# Patient Record
Sex: MALE | Race: WHITE | NOT HISPANIC OR LATINO | Employment: FULL TIME | URBAN - METROPOLITAN AREA
[De-identification: names, ages, dates, MRNs, and addresses within clinical notes are randomized per-mention and may not be internally consistent; named-entity substitution may affect disease eponyms.]

---

## 2020-03-11 ENCOUNTER — TRANSFERRED RECORDS (OUTPATIENT)
Dept: HEALTH INFORMATION MANAGEMENT | Facility: CLINIC | Age: 70
End: 2020-03-11

## 2020-09-21 ENCOUNTER — TRANSFERRED RECORDS (OUTPATIENT)
Dept: HEALTH INFORMATION MANAGEMENT | Facility: CLINIC | Age: 70
End: 2020-09-21

## 2020-11-17 ENCOUNTER — TRANSFERRED RECORDS (OUTPATIENT)
Dept: HEALTH INFORMATION MANAGEMENT | Facility: CLINIC | Age: 70
End: 2020-11-17

## 2021-01-04 ENCOUNTER — TRANSCRIBE ORDERS (OUTPATIENT)
Dept: OTHER | Age: 71
End: 2021-01-04

## 2021-01-04 DIAGNOSIS — N28.89 OTHER SPECIFIED DISORDERS OF KIDNEY AND URETER: Primary | ICD-10-CM

## 2021-01-11 NOTE — TELEPHONE ENCOUNTER
MEDICAL RECORDS REQUEST   [facility]  Urology Clinic  [address] [place] [address]  PHONE: [phone]  Fax: [phone]        FUTURE VISIT INFORMATION                                                   Sedrick Clements : 1950 scheduled for future visit at [facility]    APPOINTMENT INFORMATION:    Date: 2/3/2021 10:30AM    Provider:  Fabricio Gross MD    Reason for Visit/Diagnosis: Kidney mass    REFERRAL INFORMATION:    Referring provider:  N/A    Specialty: N/A    Referring providers [facility]    Clinic contact number:  N/A    RECORDS REQUESTED FOR VISIT                                                     NOTES  STATUS/DETAILS   OFFICE NOTE from referring provider  yes   OFFICE NOTE from other specialist  no   DISCHARGE SUMMARY from hospital  no   DISCHARGE REPORT from the ER  no   OPERATIVE REPORT  no   MEDICATION LIST  no   KIDNEY MASS     CT   yes     PRE-VISIT CHECKLIST      Record collection complete YES- [facility] rec[facility]  Images in  PACS    If no, please explain: [facility] and recs -   Appointment appropriately scheduled           (right time/right provider) Yes   MyChart activation If no, please explain: in process   Questionnaire complete If no, please explain: in process      Completed by: Nida Yepez

## 2021-01-27 ENCOUNTER — PRE VISIT (OUTPATIENT)
Dept: UROLOGY | Facility: CLINIC | Age: 71
End: 2021-01-27

## 2021-01-27 NOTE — TELEPHONE ENCOUNTER
Chief Complaint : Consult     Hx/Sx: L Renal Mass, BPH w/ LUTS    Records/Orders: [facility]    Pt Contacted: N/a    At Rooming: emmanuel Jones EMT

## 2021-02-03 ENCOUNTER — PRE VISIT (OUTPATIENT)
Dept: UROLOGY | Facility: CLINIC | Age: 71
End: 2021-02-03

## 2021-02-03 ENCOUNTER — TELEPHONE (OUTPATIENT)
Dept: UROLOGY | Facility: CLINIC | Age: 71
End: 2021-02-03

## 2021-02-03 NOTE — TELEPHONE ENCOUNTER
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 He will call back to reschedule.      Action Taken: Message routed to:  [facility] Urology    Travel Screening: Not Applicable

## 2021-02-03 NOTE — TELEPHONE ENCOUNTER
[facility] Call Center    Phone Message    May a detailed message be left on voicemail: no     Reason for Call: Other: Pt has a video visit with Dr. Gross today at 10:30 AM.  Pt says he hasn't received a video visit link in either of his email or phone.  Please call back at .     Action Taken: Message routed to:  [facility] Urology    Travel Screening: Not Applicable

## 2021-02-10 ENCOUNTER — VIRTUAL VISIT (OUTPATIENT)
Dept: UROLOGY | Facility: CLINIC | Age: 71
End: 2021-02-10
Payer: COMMERCIAL

## 2021-02-10 VITALS — BODY MASS INDEX: 34.51 KG/M2 | WEIGHT: 233 LBS | HEIGHT: 69 IN

## 2021-02-10 DIAGNOSIS — N28.89 LEFT RENAL MASS: Primary | ICD-10-CM

## 2021-02-10 PROBLEM — E11.9 DIABETES MELLITUS (H): Status: ACTIVE | Noted: 2020-06-05

## 2021-02-10 PROBLEM — N28.9 KIDNEY DISORDER: Status: ACTIVE | Noted: 2020-09-24

## 2021-02-10 PROCEDURE — 99213 OFFICE O/P EST LOW 20 MIN: CPT | Mod: 95 | Performed by: UROLOGY

## 2021-02-10 RX ORDER — SEMAGLUTIDE 1.34 MG/ML
0.5 INJECTION, SOLUTION SUBCUTANEOUS
COMMUNITY

## 2021-02-10 RX ORDER — METFORMIN HCL 500 MG
1000 TABLET, EXTENDED RELEASE 24 HR ORAL
COMMUNITY
Start: 2020-09-12 | End: 2021-12-16

## 2021-02-10 RX ORDER — OMEPRAZOLE 40 MG/1
40 CAPSULE, DELAYED RELEASE ORAL 2 TIMES DAILY
COMMUNITY
Start: 2020-05-27 | End: 2021-12-16

## 2021-02-10 RX ORDER — SENNOSIDES A AND B 8.6 MG/1
8.6 TABLET, FILM COATED ORAL 2 TIMES DAILY
COMMUNITY
Start: 2020-09-12 | End: 2021-12-16

## 2021-02-10 RX ORDER — CALCIUM CARBONATE 500 MG/1
3-4 TABLET, CHEWABLE ORAL
COMMUNITY
End: 2021-12-16

## 2021-02-10 RX ORDER — GLIPIZIDE 10 MG/1
10 TABLET ORAL
COMMUNITY
End: 2021-12-15

## 2021-02-10 ASSESSMENT — PAIN SCALES - GENERAL: PAINLEVEL: MILD PAIN (2)

## 2021-02-10 ASSESSMENT — MIFFLIN-ST. JEOR: SCORE: 1817.26

## 2021-02-10 NOTE — NURSING NOTE
Chief Complaint   Patient presents with     Consult For     renal mass     - Claudia Rodas,   EMT Clinic Support

## 2021-02-10 NOTE — PROGRESS NOTES
Sedrick Clements is a 70 year old male who is being evaluated via a billable video visit.      How would you like to obtain your AVS? Mail a copy  If the video visit is dropped, the invitation should be resent by: Send to e-mail at: [email or web address]  Will anyone else be joining your video visit? No    Video Start Time: 12:07 PM    CHIEF COMPLAINT   It was my pleasure to see Sedrick Clements who is a 70 year old male for follow-up of renal mass.         Consult or Referral:     Mr. Sedrick Clements is a 70 year old male evaluated at the request of Dr. Carlos.         History of Present Illness:    Sedrick Clements is a very pleasant 70 year old male who presents with a history of left renal mass. This has been noted previously. [facility] and also at [facility] and has a known left lower pole renal mass. He has been referred back and forth for this and is seeking opinion about treatment options. Has MRI demonstrating mass from Sept of last year. He reports he also had CT done last year, but this is not available for review. Has more recently been seen at [facility] and is interested in biopsy or treatment options.  He reports no flank pain, abd pain, or hematuria.    MRI 9/2020  2.2 cm partially exophytic enhancing lesion in the anterior lower pole of left  kidney (series 16, image 86 and 193). No renal sinus fat invasion. Left renal  vein is patent. Bilateral renal cysts. No abdominal adenopathy.         Past Medical History:   DM  HTN         Past Surgical History:   Ankle Surgery  [facility]         Medications     Current Outpatient Medications   Medication     bismuth subsalicylate (PEPTO BISMOL) 262 MG/15ML suspension     calcium carbonate (TUMS) 500 MG chewable tablet     glipiZIDE (GLUCOTROL) 10 MG tablet     metFORMIN (GLUCOPHAGE-XR) 500 MG 24 hr tablet     omeprazole (PRILOSEC) 40 MG DR capsule     semaglutide (OZEMPIC, 1 MG/DOSE,) 2 MG/1.5ML pen     senna (SENOKOT) 8.6 MG tablet     No current  facility-administered medications for this visit.             Family History:   No known family history of  malignancy.         Social History:     Social History     Socioeconomic History     Marital status: Single     Spouse name: Not on file     Number of children: Not on file     Years of education: Not on file     Highest education level: Not on file   Occupational History     Not on file   Social Needs     Financial resource strain: Not on file     Food insecurity     Worry: Not on file     Inability: Not on file     Transportation needs     Medical: Not on file     Non-medical: Not on file   Tobacco Use     Smoking status: Not on file   Substance and Sexual Activity     Alcohol use: Not on file     Drug use: Not on file     Sexual activity: Not on file   Lifestyle     Physical activity     Days per week: Not on file     Minutes per session: Not on file     Stress: Not on file   Relationships     Social connections     Talks on phone: Not on file     Gets together: Not on file     Attends [employer] service: Not on file     Active member of club or organization: Not on file     Attends meetings of clubs or organizations: Not on file     Relationship status: Not on file     Intimate partner violence     Fear of current or ex partner: Not on file     Emotionally abused: Not on file     Physically abused: Not on file     Forced sexual activity: Not on file   Other Topics Concern     Not on file   Social History Narrative     Not on file            Allergies:   Insulins and Empagliflozin         Review of Systems:      Constitutional, HEENT, cardiovascular, pulmonary, gi and gu systems are negative, except as otherwise noted.         Physical Exam:     Vitals:  No vitals were obtained today due to virtual visit.    Physical Exam   GENERAL: Healthy, alert and no distress  EYES: Eyes grossly normal to inspection.  No discharge or erythema, or obvious scleral/conjunctival abnormalities.  RESP: No audible wheeze,  cough, or visible cyanosis.  No visible retractions or increased work of breathing.    SKIN: Visible skin clear. No significant rash, abnormal pigmentation or lesions.  NEURO: Cranial nerves grossly intact.  Mentation and speech appropriate for age.  PSYCH: Mentation appears normal, affect normal/bright, judgement and insight intact, normal speech and appearance well-groomed.      Imaging:    I directly visualized and reviewed all applicable imaging a with patient.    MRCP 9/10/2020  1. Cholelithiasis. Dilated gallbladder with mild wall thickening and  pericholecystic fat stranding with trace fluid, concerning for cholecystitis.  Correlate with Dickens's sign. No choledocholithiasis. Right anterior IHD arising  near the level of cystic duct origin, anatomic variant.  2. A 2 cm enhancing lesion in the lower pole of the left kidney, suspicious for  a primary renal neoplasm, potentially RCC.  3. Diffuse hepatic steatosis.      Outside and Past Medical records:    Review of external notes as documented above   Review of prior external note(s) from - Outside records from [facility]  Review of the result(s) of each unique test - MRI, BMP, CBC         Assessment and Plan:     Assessment: 70 year old male with history of 2 cm left lower pole renal mass. We had an extensive discussion about the significance of a localized, enhancing kidney mass.  We discussed that approximately 80% of enhancing renal masses turn out to be kidney cancer upon removal, while 20% are found to be benign.  Although certain features such as size, gender and tumor characteristics can change these risks.    We discussed the role of renal mass biopsy including the fact that renal mass biopsy is safe with current techniques, it can be inaccurate and it can be non-diagnostic.  Furthermore, the majority of patients find they ultimately need to proceed with treatment anyway.      We discussed the options for treatment of a localized kidney mass including active  surveillance, thermal ablation, and surgical extirpation.  Surgical removal has the best track record and close to a 99% chance of cure for T1a lesions compared to 90% cure with thermal ablation and is generally preferred.  Furthermore, thermal ablation is not optimal for larger masses or centrally located masses.      We discussed the role of observation and the low risk of metastases associated with lesions less than 2-3 cm in size and the potential for slow/stable growth in many cases.  However, the unpredictable natural history of these lesions was mentioned as a drawback with this approach and the lack of effective therapy in the event of systemic progression.  In general, this approach is most favored for those with limited life expectancy and/or those with indeterminate (< 2-3 cm) renal masses.    Furthermore, we discussed the relative merits of partial nephrectomy vs. radical nephrectomy and the theoretic basis behind maximal nephron preservation.  There is some data suggesting there are long term survival advantages and equivalent cancer control with nephron sparing surgery.  We also discussed the advantages and disadvantages and roles of open surgery vs. laparoscopic (and Da Roselyn assisted) surgery. Risks and benefits of robotic partial nephrectomy were discussed in detail. Risks of surgery including bleeding, infection, MI, stroke, DVT/PE, bowel injury and injury to other surrounding structures were discussed. In addition, we discussed potential for positive surgical margins, urine leak, vascular injury, and potential need for radical nephrectomy or conversion to an open procedure.      The anticipated post-operative course was explained as well as expectations for recovery. All questions were answered.    He is interested in at least a short-term period of observation for this mass. Will plan to follow-up in 6 months with CT renal mass protocol. In the interim, I will also review his recent CT should  this get sent to us.    Plan:  Will send me CT  Tentative plan for CT in 6 months    Orders  Orders Placed This Encounter   Procedures     CT Renal Mass wo & w Contrast     Video-Visit Details    Type of service:  Video Visit    Video End Time:12:30 PM    Originating Location (pt. Location): Home    Distant Location (provider location):  [facility] UROLOGY AND [facility] FOR PROSTATE AND UROLOGIC CANCERS    Platform used for Video Visit: Rafael Gross MD  Urology  [facility]

## 2021-02-10 NOTE — LETTER
2/10/2021       RE: Sedrick Clements  [address]  [facility] [address]     Dear Colleague,    Thank you for referring your patient, Sedrick Clements, to the [facility] UROLOGY [facility]IN[facility] [place] at [facility]. Please see a copy of my visit note below.    Sedrick Clements is a 70 year old male who is being evaluated via a billable video visit.      How would you like to obtain your AVS? Mail a copy  If the video visit is dropped, the invitation should be resent by: Send to e-mail at: [email or web address]  Will anyone else be joining your video visit? No    Video Start Time: 12:07 PM    CHIEF COMPLAINT   It was my pleasure to see Sedrick Clements who is a 70 year old male for follow-up of renal mass.         Consult or Referral:     Mr. Sedrick Clements is a 70 year old male evaluated at the request of Dr. Carlos.         History of Present Illness:    Sedrick Clements is a very pleasant 70 year old male who presents with a history of left renal mass. This has been noted previously. [facility] and has a known left lower pole renal mass. He has been referred back and forth for this and is seeking opinion about treatment options. Has MRI demonstrating mass from Sept of last year. He reports he also had CT done last year, but this is not available for review. Has more recently been seen at [facility] and is interested in biopsy or treatment options.  He reports no flank pain, abd pain, or hematuria.    MRI 9/2020  2.2 cm partially exophytic enhancing lesion in the anterior lower pole of left  kidney (series 16, image 86 and 193). No renal sinus fat invasion. Left renal  vein is patent. Bilateral renal cysts. No abdominal adenopathy.         Past Medical History:   DM  HTN         Past Surgical History:   Ankle Surgery  Patricia         Medications     Current Outpatient Medications   Medication     bismuth subsalicylate (PEPTO BISMOL) 262  ED Attending Note      Patient : Kika Dubon Age: 68 year old Sex: female   MRN: [id number] Encounter Date: 8/25/2020    History     Chief Complaint   Patient presents with   • Weakness     HPI: Patient is a 68-year-old female, no reported past medical history, presents today for evaluation of weakness and fatigue for the last several days.  Patient states that she has been having a mild, nonproductive cough, as well as some bilateral lower extremity redness and swelling.  Patient states she lives by her self.  Patient noted to slightly disheveled.  Patient with poorly bandaged leg wounds as well as dirty feet.  Patient states that she has hard time taking care of herself at home.  Patient states she is feeling much more weak recently.  Patient denies any fever, chills, nausea, vomiting, chest pain or abdominal pain.    Allergies     No Known Allergies  Medications     Prior to Admission medications    Medication Sig Start Date End Date Taking? Authorizing Provider   cephalexin (Keflex) 500 MG capsule Take 1 capsule by mouth 2 times daily for 10 days. 8/20/20 8/30/20  Alfredo Pablo DO   doxycycline hyclate (VIBRA-TABS) 100 MG tablet Take 1 tablet by mouth 2 times daily. 8/20/20   Alfredo Pablo DO      Past Medical History     No past medical history on file.  Past Surgical History     History reviewed. No pertinent surgical history.    Family History     No family history on file.   Social History     Social History     Tobacco Use   • Smoking status: Never Smoker   • Smokeless tobacco: Never Used   Substance Use Topics   • Alcohol use: Yes     Comment: every other day   • Drug use: Never     Review of Systems     Review of Systems   Constitutional: Negative for chills and fever.   HENT: Negative for ear pain and sore throat.    Eyes: Negative for visual disturbance.   Respiratory: Negative for cough and shortness of breath.    Cardiovascular: Negative for chest pain and palpitations.   Gastrointestinal: Negative  MG/15ML suspension     calcium carbonate (TUMS) 500 MG chewable tablet     glipiZIDE (GLUCOTROL) 10 MG tablet     metFORMIN (GLUCOPHAGE-XR) 500 MG 24 hr tablet     omeprazole (PRILOSEC) 40 MG DR capsule     semaglutide (OZEMPIC, 1 MG/DOSE,) 2 MG/1.5ML pen     senna (SENOKOT) 8.6 MG tablet     No current facility-administered medications for this visit.             Family History:   No known family history of  malignancy.         Social History:     Social History     Socioeconomic History     Marital status: Single     Spouse name: Not on file     Number of children: Not on file     Years of education: Not on file     Highest education level: Not on file   Occupational History     Not on file   Social Needs     Financial resource strain: Not on file     Food insecurity     Worry: Not on file     Inability: Not on file     Transportation needs     Medical: Not on file     Non-medical: Not on file   Tobacco Use     Smoking status: Not on file   Substance and Sexual Activity     Alcohol use: Not on file     Drug use: Not on file     Sexual activity: Not on file   Lifestyle     Physical activity     Days per week: Not on file     Minutes per session: Not on file     Stress: Not on file   Relationships     Social connections     Talks on phone: Not on file     Gets together: Not on file     Attends Sabianist service: Not on file     Active member of club or organization: Not on file     Attends meetings of clubs or organizations: Not on file     Relationship status: Not on file     Intimate partner violence     Fear of current or ex partner: Not on file     Emotionally abused: Not on file     Physically abused: Not on file     Forced sexual activity: Not on file   Other Topics Concern     Not on file   Social History Narrative     Not on file            Allergies:   Insulins and Empagliflozin         Review of Systems:      Constitutional, HEENT, cardiovascular, pulmonary, gi and gu systems are negative, except as  for abdominal pain, diarrhea, nausea and vomiting.   Genitourinary: Negative for dysuria.   Musculoskeletal: Negative for back pain and myalgias.   Skin: Positive for wound. Negative for rash.   Neurological: Positive for weakness. Negative for dizziness and light-headedness.   Hematological: Negative for adenopathy.     Physical Exam     ED Triage Vitals [08/25/20 2027]   Enc Vitals Group      /79      Heart Rate 96      Resp (!) 22      Temp 97.3 °F (36.3 °C)      Temp src Oral      SpO2 95 %      Weight 192 lb 3.9 oz (87.2 kg)      Height 5' 7\" (1.702 m)     Physical Exam  Constitutional:       General: She is not in acute distress.     Appearance: Normal appearance.   HENT:      Head: Normocephalic and atraumatic.      Right Ear: Tympanic membrane normal.      Left Ear: Tympanic membrane normal.      Nose: Nose normal.      Mouth/Throat:      Mouth: Mucous membranes are moist.      Pharynx: No oropharyngeal exudate.   Eyes:      Extraocular Movements: Extraocular movements intact.      Pupils: Pupils are equal, round, and reactive to light.   Neck:      Musculoskeletal: Neck supple.   Cardiovascular:      Rate and Rhythm: Normal rate and regular rhythm.      Pulses: Normal pulses.      Heart sounds: Normal heart sounds. No murmur.   Pulmonary:      Effort: Pulmonary effort is normal. No respiratory distress.      Breath sounds: Normal breath sounds.   Abdominal:      General: Abdomen is flat. Bowel sounds are normal.      Palpations: Abdomen is soft.      Tenderness: There is no abdominal tenderness.   Musculoskeletal:         General: No deformity.   Skin:     General: Skin is warm and dry.      Capillary Refill: Capillary refill takes less than 2 seconds.      Findings: No rash.      Comments: Erythema to bilateral lower extremities.  There is development of ulcerated wound to right lower extremity.  There is old tissue paper and hair in the wound.  Additionally, feces apparent on patient's lower feet.   Significant malodor   Neurological:      General: No focal deficit present.      Mental Status: She is alert and oriented to person, place, and time.   Psychiatric:         Behavior: Behavior normal.       Lab Results     Results for orders placed or performed during the hospital encounter of 08/25/20   CBC with Automated Differential   Result Value Ref Range    WBC 9.2 4.2 - 11.0 K/mcL    RBC 3.97 (L) 4.00 - 5.20 mil/mcL    HGB 13.7 12.0 - 15.5 g/dL    HCT 40.0 36.0 - 46.5 %    .8 (H) 78.0 - 100.0 fl    MCH 34.5 (H) 26.0 - 34.0 pg    MCHC 34.3 32.0 - 36.5 g/dL    RDW-CV 12.9 11.0 - 15.0 %     140 - 450 K/mcL    NRBC 0 0 /100 WBC    DIFF TYPE AUTOMATED DIFFERENTIAL     Neutrophil 74 %    LYMPH 13 %    MONO 12 %    EOSIN 0 %    BASO 0 %    Percent Immature Granuloctyes 1 %    Absolute Neutrophil 6.8 1.8 - 7.7 K/mcL    Absolute Lymph 1.2 1.0 - 4.0 K/mcL    Absolute Mono 1.1 (H) 0.3 - 0.9 K/mcL    Absolute Eos 0.0 (L) 0.1 - 0.5 K/mcL    Absolute Baso 0.0 0.0 - 0.3 K/mcL    Absolute Immature Granulocytes 0.1 0 - 0.2 K/mcl   Comprehensive Metabolic Panel   Result Value Ref Range    Sodium 134 (L) 135 - 145 mmol/L    Potassium 3.7 3.4 - 5.1 mmol/L    Chloride 99 98 - 107 mmol/L    Carbon Dioxide 25 21 - 32 mmol/L    Anion Gap 14 10 - 20 mmol/L    Glucose 104 (H) 65 - 99 mg/dL    BUN 14 6 - 20 mg/dL    Creatinine 0.71 0.51 - 0.95 mg/dL    GFR Estimate,  >90     GFR Estimate, Non  89     BUN/Creatinine Ratio 20 7 - 25    CALCIUM 9.1 8.4 - 10.2 mg/dL    TOTAL BILIRUBIN 0.7 0.2 - 1.0 mg/dL    AST/SGOT 64 (H) <38 Units/L    ALT/SGPT 55 <64 Units/L    ALK PHOSPHATASE 256 (H) 45 - 117 Units/L    TOTAL PROTEIN 7.0 6.4 - 8.2 g/dL    Albumin 2.3 (L) 3.6 - 5.1 g/dL    GLOBULIN 4.7 (H) 2.0 - 4.0 g/dL    A/G Ratio, Serum 0.5 (L) 1.0 - 2.4   Troponin I Ultra Sensitive   Result Value Ref Range    TROPONIN I <0.02 <0.05 ng/mL   NT proBNP   Result Value Ref Range    NT proBNP 734 (H) <126  otherwise noted.         Physical Exam:     Vitals:  No vitals were obtained today due to virtual visit.    Physical Exam   GENERAL: Healthy, alert and no distress  EYES: Eyes grossly normal to inspection.  No discharge or erythema, or obvious scleral/conjunctival abnormalities.  RESP: No audible wheeze, cough, or visible cyanosis.  No visible retractions or increased work of breathing.    SKIN: Visible skin clear. No significant rash, abnormal pigmentation or lesions.  NEURO: Cranial nerves grossly intact.  Mentation and speech appropriate for age.  PSYCH: Mentation appears normal, affect normal/bright, judgement and insight intact, normal speech and appearance well-groomed.      Imaging:    I directly visualized and reviewed all applicable imaging a with patient.    MRCP 9/10/2020  1. Cholelithiasis. Dilated gallbladder with mild wall thickening and  pericholecystic fat stranding with trace fluid, concerning for cholecystitis.  Correlate with Dickens's sign. No choledocholithiasis. Right anterior IHD arising  near the level of cystic duct origin, anatomic variant.  2. A 2 cm enhancing lesion in the lower pole of the left kidney, suspicious for  a primary renal neoplasm, potentially RCC.  3. Diffuse hepatic steatosis.      Outside and Past Medical records:    Review of external notes as documented above   Review of prior external note(s) from - Outside records from [facility]  Review of the result(s) of each unique test - MRI, BMP, CBC         Assessment and Plan:     Assessment: 70 year old male with history of 2 cm left lower pole renal mass. We had an extensive discussion about the significance of a localized, enhancing kidney mass.  We discussed that approximately 80% of enhancing renal masses turn out to be kidney cancer upon removal, while 20% are found to be benign.  Although certain features such as size, gender and tumor characteristics can change these risks.    We discussed the role of renal mass biopsy  including the fact that renal mass biopsy is safe with current techniques, it can be inaccurate and it can be non-diagnostic.  Furthermore, the majority of patients find they ultimately need to proceed with treatment anyway.      We discussed the options for treatment of a localized kidney mass including active surveillance, thermal ablation, and surgical extirpation.  Surgical removal has the best track record and close to a 99% chance of cure for T1a lesions compared to 90% cure with thermal ablation and is generally preferred.  Furthermore, thermal ablation is not optimal for larger masses or centrally located masses.      We discussed the role of observation and the low risk of metastases associated with lesions less than 2-3 cm in size and the potential for slow/stable growth in many cases.  However, the unpredictable natural history of these lesions was mentioned as a drawback with this approach and the lack of effective therapy in the event of systemic progression.  In general, this approach is most favored for those with limited life expectancy and/or those with indeterminate (< 2-3 cm) renal masses.    Furthermore, we discussed the relative merits of partial nephrectomy vs. radical nephrectomy and the theoretic basis behind maximal nephron preservation.  There is some data suggesting there are long term survival advantages and equivalent cancer control with nephron sparing surgery.  We also discussed the advantages and disadvantages and roles of open surgery vs. laparoscopic (and Da Roselyn assisted) surgery. Risks and benefits of robotic partial nephrectomy were discussed in detail. Risks of surgery including bleeding, infection, MI, stroke, DVT/PE, bowel injury and injury to other surrounding structures were discussed. In addition, we discussed potential for positive surgical margins, urine leak, vascular injury, and potential need for radical nephrectomy or conversion to an open procedure.      The  pg/mL   TIER 1   Result Value Ref Range    Lactic Acid RC Venous 1.4 <2.0 mmol/L   URINALYSIS WITH MICRO & CULTURE IF INDICATED   Result Value Ref Range    KETONES TRACE (A) NEGATIVE mg/dL    SPECIMEN TYPE URINE CLEAN CATCH     COLOR PARKER (A) YELLOW    APPEARANCE HAZY     GLUCOSE(URINE) NEGATIVE NEGATIVE mg/dL    BILIRUBIN POSITIVE (A) NEGATIVE    SPECIFIC GRAVITY 1.027 1.005 - 1.030    BLOOD NEGATIVE NEGATIVE    pH 6.0 5.0 - 7.0 Units    PROTEIN(URINE) 30 (A) NEGATIVE mg/dL    UROBILINOGEN 4.0 (H) 0.0 - 1.0 mg/dL    NITRITE NEGATIVE NEGATIVE    LEUKOCYTE ESTERASE NEGATIVE NEGATIVE    Squamous EPI'S 1 to 5 0 - 5 /hpf    RBC 1 to 2 0 - 2 /hpf    WBC 1 to 5 0 - 5 /hpf    BACTERIA NONE SEEN NONE SEEN /hpf    Hyaline Casts 1 to 5 0 - 5 /lpf    MUCOUS PRESENT    Magnesium   Result Value Ref Range    MAGNESIUM 2.0 1.7 - 2.4 mg/dL   Procalcitonin   Result Value Ref Range    PROCALCITONIN <0.05 <0.10 ng/mL     EKG Results     EKG Interpretation  Comments: Normal sinus rhythm with ventricular rate of 87 bpm.  No ST segment ovation noted.  Normal QRS duration.  Normal QTC.  Otherwise unremarkable EKG.    EKG tracing interpreted by ED physician  Radiology Results     Imaging Results          XR CHEST PA AND LATERAL 2 VIEWS (Final result)  Result time 08/25/20 21:21:51    Final result                 Impression:       As above      Electronically Signed by: JEREMY CHRISTY M.D.   Signed on: 8/25/2020 9:21 PM                Narrative:    EXAM: XR CHEST PA AND LATERAL 2 VIEWS    CLINICAL INDICATION: Chest pain    COMPARISON: None.    FINDINGS: There is a focal opacity in the right perihilar region.  This measures 46 x 27 mm.  Although this could be infectious such as a round pneumonia, follow-up imaging should be obtained to exclude a pulmonary mass.      Elevation of the right hemidiaphragm.  Cardiomegaly.       Pulmonary vascularity slightly increased.    The trachea is deviated to the right.  Could be related to a enlarged  anticipated post-operative course was explained as well as expectations for recovery. All questions were answered.    He is interested in at least a short-term period of observation for this mass. Will plan to follow-up in 6 months with CT renal mass protocol. In the interim, I will also review his recent CT should this get sent to us.    Plan:  Will send me CT  Tentative plan for CT in 6 months    Orders  Orders Placed This Encounter   Procedures     CT Renal Mass wo & w Contrast     Video-Visit Details    Type of service:  Video Visit    Video End Time:12:30 PM    Originating Location (pt. Location): Home    Distant Location (provider location):  [facility] UROLOGY AND [facility] FOR PROSTATE AND UROLOGIC CANCERS    Platform used for Video Visit: Rafael Gross MD  Urology  [facility]       thyroid, vascular adenopathy.  Further diagnostic imaging.                              ED Course     ED Course as of Aug 25 2230   Tue Aug 25, 2020   2204 Patient admitted to Dr. Tio Goode.    [AA]      ED Course User Index  [AA] Ran Silva MD       ED Medication Orders (From admission, onward)    Ordered Start     Status Ordering Provider    08/25/20 2203 08/25/20 2203  SODIUM CHLORIDE 0.9 % IV SOLN Pyxis Override     Note to Pharmacy: KAIT PURCELL   : cabinet override    Last MAR action: New Bag     08/25/20 2153 08/25/20 2200  cefTRIAXone (ROCEPHIN) syringe 2,000 mg  ONCE      Last MAR action: Given RAN SILVA          Patient did have mask on for their entire stay in the emergency department.  Additionally, I did wear recommended personal protective equipment, including face mask, eye protection, gloves and gown when initially assessing patient and when re-examining.    MDM  Number of Diagnoses or Management Options  Failure to thrive in adult:   Weakness:   Diagnosis management comments: Patient presents today for evaluation of weakness.  Patient disheveled.  Patient with leg wounds which are not capped.  Patient had these cleaned and prepped.  Patient additionally concern for possible CHF.  Patient elevated BNP.  EKG nonischemic.  Urinalysis initially concerning for infection, however no significant bacteria in urine.  Patient was given dose of ceftriaxone.  This also will cover for cellulitis of lower extremity.  Patient be admitted for further evaluation and care including need for evaluation by care management for assistance at home as patient can obviously not take care of herself appropriately.  Patient admitted to Dr. Goode.      Clinical Impression     ED Diagnosis   1. Weakness     2. Failure to thrive in adult       Disposition        Admit 8/25/2020 10:06 PM  Telemetry Bed?: No  Patient Class: Inpatient [1]  Level of Care: Acute [1]  Admission Diagnosis: UTI (urinary  tract infection) [457370]  Admitting Physician: CHRYSTAL STACY [[id number]]  Comments: assymptoatic surv, no test  Is this a telephone or verbal order?: This is a telephone order from the admitting physician      Ran Silva MD   8/25/2020 10:27 PM                  Ran Silva MD  08/25/20 6032

## 2021-02-16 ENCOUNTER — DOCUMENTATION ONLY (OUTPATIENT)
Dept: UROLOGY | Facility: CLINIC | Age: 71
End: 2021-02-16

## 2021-02-16 NOTE — PROGRESS NOTES
Eduardo Alva 2/16/21 10:06AM   Action Taken RTN PT   CSS called and LM for pt to return call, Inquiring on any recent CT images for his 6 month follow up.

## 2021-02-18 ENCOUNTER — TELEPHONE (OUTPATIENT)
Dept: ONCOLOGY | Facility: CLINIC | Age: 71
End: 2021-02-18

## 2021-02-18 NOTE — TELEPHONE ENCOUNTER
[facility] Call Center    Phone Message    May a detailed message be left on voicemail: yes     Reason for Call: Other: Pt wondering whether or not he needs to do the CT scan now that he had [place] send records of a CT scan from 9/21/20. Also wants to clarify if he needs to be seen in 6 weeks or 6 months. Please call back.     Action Taken: Message routed to:  [facility] ([facility] urology    Travel Screening: Not Applicable

## 2021-08-17 ENCOUNTER — PRE VISIT (OUTPATIENT)
Dept: UROLOGY | Facility: CLINIC | Age: 71
End: 2021-08-17

## 2021-08-17 NOTE — TELEPHONE ENCOUNTER
Reason for visit: Follow up w/ CT     Relevant information: Left renal mass    Records/imaging/labs/orders: CT renal mass on 9/2 before visit    Pt called: N/A    At Rooming: Video

## 2021-09-02 ENCOUNTER — ANCILLARY PROCEDURE (OUTPATIENT)
Dept: CT IMAGING | Facility: CLINIC | Age: 71
End: 2021-09-02
Attending: UROLOGY
Payer: COMMERCIAL

## 2021-09-02 ENCOUNTER — VIRTUAL VISIT (OUTPATIENT)
Dept: UROLOGY | Facility: CLINIC | Age: 71
End: 2021-09-02
Payer: COMMERCIAL

## 2021-09-02 VITALS — WEIGHT: 230 LBS | BODY MASS INDEX: 34.07 KG/M2 | HEIGHT: 69 IN

## 2021-09-02 DIAGNOSIS — N28.89 LEFT RENAL MASS: ICD-10-CM

## 2021-09-02 DIAGNOSIS — N28.89 LEFT RENAL MASS: Primary | ICD-10-CM

## 2021-09-02 LAB
CREAT BLD-MCNC: 1 MG/DL (ref 0.7–1.3)
GFR SERPL CREATININE-BSD FRML MDRD: >60 ML/MIN/1.73M2

## 2021-09-02 PROCEDURE — 99214 OFFICE O/P EST MOD 30 MIN: CPT | Mod: 95 | Performed by: UROLOGY

## 2021-09-02 PROCEDURE — 74178 CT ABD&PLV WO CNTR FLWD CNTR: CPT | Performed by: RADIOLOGY

## 2021-09-02 RX ORDER — IOPAMIDOL 755 MG/ML
135 INJECTION, SOLUTION INTRAVASCULAR ONCE
Status: COMPLETED | OUTPATIENT
Start: 2021-09-02 | End: 2021-09-02

## 2021-09-02 RX ORDER — CEFAZOLIN SODIUM 2 G/50ML
2 SOLUTION INTRAVENOUS SEE ADMIN INSTRUCTIONS
Status: CANCELLED | OUTPATIENT
Start: 2021-09-02

## 2021-09-02 RX ORDER — CEFAZOLIN SODIUM 2 G/50ML
2 SOLUTION INTRAVENOUS
Status: CANCELLED | OUTPATIENT
Start: 2021-09-02

## 2021-09-02 RX ADMIN — IOPAMIDOL 135 ML: 755 INJECTION, SOLUTION INTRAVASCULAR at 09:53

## 2021-09-02 ASSESSMENT — PAIN SCALES - GENERAL: PAINLEVEL: NO PAIN (0)

## 2021-09-02 ASSESSMENT — MIFFLIN-ST. JEOR: SCORE: 1798.65

## 2021-09-02 NOTE — PROGRESS NOTES
Sedrick Clements is a 71 year old male who is being evaluated via a billable video visit.      How would you like to obtain your AVS? Mail a copy  If the video visit is dropped, the invitation should be resent by: Send to e-mail at: [email or web address]  Will anyone else be joining your video visit? No    Video Start Time: 1:07 PM    CHIEF COMPLAINT   It was my pleasure to see Sedrick Clements who is a 71 year old male for follow-up of renal mass.      HPI  Sedrick Clements is a very pleasant 70 year old male who presents with a history of left renal mass. This has been noted previously. [facility], and also at [facility] and has a known left lower pole renal mass. He has been referred back and forth for this and is seeking opinion about treatment options. Mass has previously been small, and has been monitored.    Here to review interval CT scan, demonstrating growth of this left renal mass.  This now measures 2.5 cm. He continues to have no symptoms. No flank pain. No hematuria.    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 No evidence of local  regional or distant metastasis. Cholecystectomy. Accessory splenules.  No evidence of renal vein or IVC involvement. Additional  simple-appearing left renal cysts. Possible mild small bowel  enteritis. Prostate enlargement.     MRI 9/2020  2.2 cm partially exophytic enhancing lesion in the anterior lower pole of left  kidney (series 16, image 86 and 193). No renal sinus fat invasion. Left renal  vein is patent. Bilateral renal cysts. No abdominal adenopathy.         Allergies:   Insulins and Empagliflozin         Review of Systems:  From intake questionnaire     Skin: negative  Eyes: negative  Ears/Nose/Throat: negative  Respiratory: No shortness of breath, dyspnea on exertion, cough, or  hemoptysis  Cardiovascular: No chest pain or palpitations  Gastrointestinal: negative; no nausea/vomiting, constipation or diarrhea  Genitourinary: as per HPI  Musculoskeletal: negative  Neurologic: negative  Psychiatric: negative  Hematologic/Lymphatic/Immunologic: negative  Endocrine: negative         Physical Exam:     Vitals:  No vitals were obtained today due to virtual visit.    Physical Exam   GENERAL: Healthy, alert and no distress  EYES: Eyes grossly normal to inspection.  No discharge or erythema, or obvious scleral/conjunctival abnormalities.  RESP: No audible wheeze, cough, or visible cyanosis.  No visible retractions or increased work of breathing.    SKIN: Visible skin clear. No significant rash, abnormal pigmentation or lesions.  NEURO: Cranial nerves grossly intact.  Mentation and speech appropriate for age.  PSYCH: Mentation appears normal, affect normal/bright, judgement and insight intact, normal speech and appearance well-groomed.      Outside and Past Medical records:    Review of the result(s) of each unique test - MRI, CT, creatinine         Assessment and Plan:     71 year old male with history of 2.5 cm left lower pole renal mass. We reviewed our previous discussion about the significance of a localized, enhancing kidney mass.  We discussed that approximately 80% of enhancing renal masses turn out to be kidney cancer upon removal, while 20% are found to be benign.  Although certain features such as size, gender and tumor characteristics can change these risks.    We discussed the role of renal mass biopsy including the fact that renal mass biopsy is safe with current techniques, it can be inaccurate and it can be non-diagnostic.  Furthermore, the majority of patients find they ultimately need to proceed with treatment anyway.      We discussed the options for treatment of a localized kidney mass including active surveillance, thermal ablation, and surgical extirpation.  Surgical removal has  the best track record and close to a 99% chance of cure for T1a lesions compared to 90% cure with thermal ablation and is generally preferred.  Furthermore, thermal ablation is not optimal for larger masses or centrally located masses.    We discussed the role of observation and the low risk of metastases associated with lesions less than 2-3 cm in size and the potential for slow/stable growth in many cases.  However, the unpredictable natural history of these lesions was mentioned as a drawback with this approach and the lack of effective therapy in the event of systemic progression.  In general, this approach is most favored for those with limited life expectancy and/or those with indeterminate (< 2-3 cm) renal masses.    Furthermore, we discussed the relative merits of partial nephrectomy vs. radical nephrectomy and the theoretic basis behind maximal nephron preservation.  There is some data suggesting there are long term survival advantages and equivalent cancer control with nephron sparing surgery.  We also discussed the advantages and disadvantages and roles of open surgery vs. laparoscopic (and Da Roselyn assisted) surgery. Risks and benefits of robotic partial nephrectomy were discussed in detail. Risks of surgery including bleeding, infection, MI, stroke, DVT/PE, bowel injury and injury to other surrounding structures were discussed. In addition, we discussed potential for positive surgical margins, urine leak, vascular injury, and potential need for radical nephrectomy or conversion to an open procedure.      The anticipated post-operative course was explained as well as expectations for recovery. All questions were answered.  A written informed consent will be finalized on the morning of the procedure.    Plan:  Robotic partial at [facility]    Video-Visit Details    Type of service:  Video Visit    Video End Time:1:18 PM    Originating Location (pt. Location): Home    Distant Location (provider location):   [facility] UROLOGY AND [facility] FOR PROSTATE AND UROLOGIC CANCERS    Platform used for Video Visit: Plannify    31 minutes spent on the date of the encounter including direct interaction with the patient, performing chart review, history and exam, documentation and further activities as noted above.    Fabricio Gross MD  Urology  [facility]

## 2021-09-02 NOTE — NURSING NOTE
"Chief Complaint   Patient presents with     Follow Up     Follow up w/ CT       Height 1.753 m (5' 9\"), weight 104.3 kg (230 lb). Body mass index is 33.97 kg/m .    Patient Active Problem List   Diagnosis     Hypertension     Diabetes mellitus (H)     Kidney disorder     Left renal mass       Allergies   Allergen Reactions     Insulins Hives     Pt not sure what kind of insulin, pt states he had hives from insulin  Pt not sure which insulin causes reaction.  States he had hives from insulin.       Empagliflozin Other (See Comments)     frequent urination       Current Outpatient Medications   Medication Sig Dispense Refill     bismuth subsalicylate (PEPTO BISMOL) 262 MG/15ML suspension Take 30 mLs by mouth       calcium carbonate (TUMS) 500 MG chewable tablet Take 3-4 tablets by mouth       glipiZIDE (GLUCOTROL) 10 MG tablet Take 10 mg by mouth       semaglutide (OZEMPIC, 1 MG/DOSE,) 2 MG/1.5ML pen Inject 0.5 mg Subcutaneous       metFORMIN (GLUCOPHAGE-XR) 500 MG 24 hr tablet Take 1,000 mg by mouth (Patient not taking: Reported on 9/2/2021)       omeprazole (PRILOSEC) 40 MG DR capsule Take 40 mg by mouth (Patient not taking: Reported on 9/2/2021)       senna (SENOKOT) 8.6 MG tablet Take 8.6 mg by mouth (Patient not taking: Reported on 9/2/2021)         Social History     Tobacco Use     Smoking status: Not on file   Substance Use Topics     Alcohol use: Not on file     Drug use: Not on file       Callie Woodard, EMT  9/2/2021  12:32 PM  "

## 2021-11-16 LAB — HBA1C MFR BLD: 8.5 % (ref 4.8–5.6)

## 2021-11-25 DIAGNOSIS — Z11.59 ENCOUNTER FOR SCREENING FOR OTHER VIRAL DISEASES: ICD-10-CM

## 2021-12-02 ENCOUNTER — TRANSFERRED RECORDS (OUTPATIENT)
Dept: HEALTH INFORMATION MANAGEMENT | Facility: CLINIC | Age: 71
End: 2021-12-02
Payer: COMMERCIAL

## 2021-12-15 RX ORDER — SUCRALFATE 1 G/1
1 TABLET ORAL 4 TIMES DAILY
COMMUNITY

## 2021-12-15 RX ORDER — PHENOBARBITAL, HYOSCYAMINE SULFATE, ATROPINE SULFATE, SCOPOLAMINE HYDROBROMIDE .0194; .1037; 16.2; .0065 MG/5ML; MG/5ML; MG/5ML; MG/5ML
10 ELIXIR ORAL 4 TIMES DAILY PRN
COMMUNITY
End: 2021-12-16

## 2021-12-15 RX ORDER — GLIPIZIDE 10 MG/1
10 TABLET, FILM COATED, EXTENDED RELEASE ORAL 2 TIMES DAILY
COMMUNITY

## 2021-12-15 RX ORDER — OXYCODONE HYDROCHLORIDE 10 MG/1
5 TABLET ORAL EVERY 4 HOURS PRN
COMMUNITY

## 2021-12-15 RX ORDER — CLOTRIMAZOLE 1 %
CREAM (GRAM) TOPICAL 2 TIMES DAILY
COMMUNITY
End: 2021-12-16

## 2021-12-15 RX ORDER — PREGABALIN 50 MG/1
50 CAPSULE ORAL 3 TIMES DAILY
COMMUNITY
End: 2021-12-16

## 2021-12-15 RX ORDER — OXYBUTYNIN CHLORIDE 5 MG/1
5 TABLET ORAL DAILY
COMMUNITY
End: 2021-12-16

## 2021-12-15 RX ORDER — ATORVASTATIN CALCIUM 80 MG/1
40 TABLET, FILM COATED ORAL DAILY
COMMUNITY

## 2021-12-15 RX ORDER — KETOCONAZOLE 20 MG/ML
SHAMPOO TOPICAL DAILY PRN
COMMUNITY

## 2021-12-15 RX ORDER — DIPHENOXYLATE HCL/ATROPINE 2.5-.025MG
1 TABLET ORAL 4 TIMES DAILY PRN
COMMUNITY

## 2021-12-15 RX ORDER — TAMSULOSIN HYDROCHLORIDE 0.4 MG/1
0.4 CAPSULE ORAL AT BEDTIME
COMMUNITY

## 2021-12-15 RX ORDER — TRIAMCINOLONE ACETONIDE 1 MG/G
CREAM TOPICAL 2 TIMES DAILY
COMMUNITY
End: 2021-12-16

## 2021-12-15 RX ORDER — LIDOCAINE HYDROCHLORIDE 40 MG/ML
5 SOLUTION TOPICAL 4 TIMES DAILY PRN
COMMUNITY
End: 2021-12-16

## 2021-12-16 NOTE — PROGRESS NOTES
PTA medications updated by Medication Scribe prior to surgery via phone call with patient (last doses completed by Nurse)     Medication history sources: Patient, Surescripts and H&P  In the past week, patient estimated taking medication this percent of the time: Greater than 90%  Adherence assessment: N/A Not Observed    Significant changes made to the medication list:  Patient reports no longer taking the following meds (med scribe removed from PTA med list): Donnatal, Bismuth Subsalicylate, Tums, Clotrimazole, Lidocaine 4% Solution, Metformin, Omeprazole, Oxybutynin, Pregabalin, Senna, Tramcinolone Cream, Loratadine      Additional medication history information:   None    Medication reconciliation completed by provider prior to medication history? No    Time spent in this activity: 25 minutes    The information provided in this note is only as accurate as the sources available at the time of update(s)    Prior to Admission medications    Medication Sig Last Dose Taking? Auth Provider   atorvastatin (LIPITOR) 80 MG tablet Take 40 mg by mouth daily (0.5 X 80 mg) 12/16/2021 at PM Yes Reported, Patient   diphenoxylate-atropine (LOMOTIL) 2.5-0.025 MG tablet Take 1 tablet by mouth 4 times daily as needed for diarrhea  at PRN Yes Reported, Patient   glipiZIDE (GLUCOTROL XL) 10 MG 24 hr tablet Take 10 mg by mouth 2 times daily 12/16/2021 at PM Yes Reported, Patient   ketoconazole (NIZORAL) 2 % external shampoo Apply topically daily as needed for itching or irritation  at PRN Yes Reported, Patient   oxyCODONE IR (ROXICODONE) 10 MG tablet Take 5 mg by mouth every 4 hours as needed for severe pain  at PRN Yes Reported, Patient   semaglutide (OZEMPIC, 1 MG/DOSE,) 2 MG/1.5ML pen Inject 0.5 mg Subcutaneous every 7 days (Mondays) 12/13/2021 at AM Yes Reported, Patient   sucralfate (CARAFATE) 1 GM tablet Take 1 g by mouth 4 times daily 12/16/2021 at PM Yes Reported, Patient   tamsulosin (FLOMAX) 0.4 MG capsule Take 0.4 mg by  mouth At Bedtime 12/16/2021 at PM Yes Reported, Patient   insulin degludec (TRESIBA) 100 UNIT/ML pen Inject 20 Units Subcutaneous daily   Reported, Patient     Medication history completed by:    Sebastián Noyola CPhT  Medication [facility]

## 2021-12-17 ENCOUNTER — HOSPITAL ENCOUNTER (INPATIENT)
Facility: CLINIC | Age: 71
LOS: 1 days | Discharge: HOME | DRG: 658 | End: 2021-12-19
Attending: UROLOGY | Admitting: STUDENT IN AN ORGANIZED HEALTH CARE EDUCATION/TRAINING PROGRAM
Payer: COMMERCIAL

## 2021-12-17 ENCOUNTER — ANESTHESIA EVENT (OUTPATIENT)
Dept: SURGERY | Facility: CLINIC | Age: 71
DRG: 658 | End: 2021-12-17
Payer: COMMERCIAL

## 2021-12-17 ENCOUNTER — ANESTHESIA (OUTPATIENT)
Dept: SURGERY | Facility: CLINIC | Age: 71
DRG: 658 | End: 2021-12-17
Payer: COMMERCIAL

## 2021-12-17 DIAGNOSIS — N28.89 RENAL MASS: Primary | ICD-10-CM

## 2021-12-17 LAB
ABO/RH(D): NORMAL
ANION GAP SERPL CALCULATED.3IONS-SCNC: 4 MMOL/L (ref 3–14)
ANTIBODY SCREEN: NEGATIVE
BUN SERPL-MCNC: 19 MG/DL (ref 7–30)
CALCIUM SERPL-MCNC: 8.4 MG/DL (ref 8.5–10.1)
CHLORIDE BLD-SCNC: 105 MMOL/L (ref 94–109)
CO2 SERPL-SCNC: 25 MMOL/L (ref 20–32)
CREAT SERPL-MCNC: 0.86 MG/DL (ref 0.66–1.25)
CREAT SERPL-MCNC: 1.15 MG/DL (ref 0.66–1.25)
ERYTHROCYTE [DISTWIDTH] IN BLOOD BY AUTOMATED COUNT: 11.9 % (ref 10–15)
GFR SERPL CREATININE-BSD FRML MDRD: 65 ML/MIN/1.73M2
GFR SERPL CREATININE-BSD FRML MDRD: 88 ML/MIN/1.73M2
GLUCOSE BLD-MCNC: 142 MG/DL (ref 70–99)
GLUCOSE BLD-MCNC: 207 MG/DL (ref 70–99)
GLUCOSE BLDC GLUCOMTR-MCNC: 149 MG/DL (ref 70–99)
GLUCOSE BLDC GLUCOMTR-MCNC: 151 MG/DL (ref 70–99)
GLUCOSE BLDC GLUCOMTR-MCNC: 190 MG/DL (ref 70–99)
GLUCOSE BLDC GLUCOMTR-MCNC: 201 MG/DL (ref 70–99)
HCT VFR BLD AUTO: 44.7 % (ref 40–53)
HGB BLD-MCNC: 14.8 G/DL (ref 13.3–17.7)
LACTATE SERPL-SCNC: 1.7 MMOL/L (ref 0.7–2)
MCH RBC QN AUTO: 29.4 PG (ref 26.5–33)
MCHC RBC AUTO-ENTMCNC: 33.1 G/DL (ref 31.5–36.5)
MCV RBC AUTO: 89 FL (ref 78–100)
PLATELET # BLD AUTO: 221 10E3/UL (ref 150–450)
POTASSIUM BLD-SCNC: 3.7 MMOL/L (ref 3.4–5.3)
POTASSIUM BLD-SCNC: 4.4 MMOL/L (ref 3.4–5.3)
RBC # BLD AUTO: 5.03 10E6/UL (ref 4.4–5.9)
SODIUM SERPL-SCNC: 134 MMOL/L (ref 133–144)
SPECIMEN EXPIRATION DATE: NORMAL
WBC # BLD AUTO: 16.1 10E3/UL (ref 4–11)

## 2021-12-17 PROCEDURE — 86850 RBC ANTIBODY SCREEN: CPT | Performed by: UROLOGY

## 2021-12-17 PROCEDURE — 999N000141 HC STATISTIC PRE-PROCEDURE NURSING ASSESSMENT: Performed by: UROLOGY

## 2021-12-17 PROCEDURE — 250N000011 HC RX IP 250 OP 636: Performed by: NURSE ANESTHETIST, CERTIFIED REGISTERED

## 2021-12-17 PROCEDURE — 99203 OFFICE O/P NEW LOW 30 MIN: CPT | Performed by: PHYSICIAN ASSISTANT

## 2021-12-17 PROCEDURE — 99207 PR CONSULT E&M CHANGED TO INITIAL LEVEL: CPT | Performed by: PHYSICIAN ASSISTANT

## 2021-12-17 PROCEDURE — 82962 GLUCOSE BLOOD TEST: CPT

## 2021-12-17 PROCEDURE — 250N000011 HC RX IP 250 OP 636: Performed by: ANESTHESIOLOGY

## 2021-12-17 PROCEDURE — 250N000011 HC RX IP 250 OP 636: Performed by: UROLOGY

## 2021-12-17 PROCEDURE — 710N000009 HC RECOVERY PHASE 1, LEVEL 1, PER MIN: Performed by: UROLOGY

## 2021-12-17 PROCEDURE — 370N000017 HC ANESTHESIA TECHNICAL FEE, PER MIN: Performed by: UROLOGY

## 2021-12-17 PROCEDURE — 360N000080 HC SURGERY LEVEL 7, PER MIN: Performed by: UROLOGY

## 2021-12-17 PROCEDURE — 250N000009 HC RX 250: Performed by: PHYSICIAN ASSISTANT

## 2021-12-17 PROCEDURE — 258N000003 HC RX IP 258 OP 636: Performed by: STUDENT IN AN ORGANIZED HEALTH CARE EDUCATION/TRAINING PROGRAM

## 2021-12-17 PROCEDURE — 258N000003 HC RX IP 258 OP 636: Performed by: ANESTHESIOLOGY

## 2021-12-17 PROCEDURE — 83605 ASSAY OF LACTIC ACID: CPT | Performed by: UROLOGY

## 2021-12-17 PROCEDURE — 250N000013 HC RX MED GY IP 250 OP 250 PS 637: Performed by: STUDENT IN AN ORGANIZED HEALTH CARE EDUCATION/TRAINING PROGRAM

## 2021-12-17 PROCEDURE — 82947 ASSAY GLUCOSE BLOOD QUANT: CPT | Performed by: ANESTHESIOLOGY

## 2021-12-17 PROCEDURE — 82565 ASSAY OF CREATININE: CPT | Performed by: ANESTHESIOLOGY

## 2021-12-17 PROCEDURE — 84132 ASSAY OF SERUM POTASSIUM: CPT | Performed by: ANESTHESIOLOGY

## 2021-12-17 PROCEDURE — 85027 COMPLETE CBC AUTOMATED: CPT | Performed by: STUDENT IN AN ORGANIZED HEALTH CARE EDUCATION/TRAINING PROGRAM

## 2021-12-17 PROCEDURE — 76770 US EXAM ABDO BACK WALL COMP: CPT | Mod: 26 | Performed by: UROLOGY

## 2021-12-17 PROCEDURE — 258N000003 HC RX IP 258 OP 636: Performed by: UROLOGY

## 2021-12-17 PROCEDURE — 250N000011 HC RX IP 250 OP 636: Performed by: STUDENT IN AN ORGANIZED HEALTH CARE EDUCATION/TRAINING PROGRAM

## 2021-12-17 PROCEDURE — 250N000009 HC RX 250: Performed by: NURSE ANESTHETIST, CERTIFIED REGISTERED

## 2021-12-17 PROCEDURE — 36415 COLL VENOUS BLD VENIPUNCTURE: CPT | Performed by: STUDENT IN AN ORGANIZED HEALTH CARE EDUCATION/TRAINING PROGRAM

## 2021-12-17 PROCEDURE — 8E0W4CZ ROBOTIC ASSISTED PROCEDURE OF TRUNK REGION, PERCUTANEOUS ENDOSCOPIC APPROACH: ICD-10-PCS | Performed by: UROLOGY

## 2021-12-17 PROCEDURE — 36415 COLL VENOUS BLD VENIPUNCTURE: CPT | Performed by: UROLOGY

## 2021-12-17 PROCEDURE — 88331 PATH CONSLTJ SURG 1 BLK 1SPC: CPT | Mod: TC | Performed by: UROLOGY

## 2021-12-17 PROCEDURE — 84132 ASSAY OF SERUM POTASSIUM: CPT | Performed by: STUDENT IN AN ORGANIZED HEALTH CARE EDUCATION/TRAINING PROGRAM

## 2021-12-17 PROCEDURE — 50543 LAPARO PARTIAL NEPHRECTOMY: CPT | Mod: LT | Performed by: UROLOGY

## 2021-12-17 PROCEDURE — 250N000025 HC SEVOFLURANE, PER MIN: Performed by: UROLOGY

## 2021-12-17 PROCEDURE — 88305 TISSUE EXAM BY PATHOLOGIST: CPT | Mod: TC | Performed by: UROLOGY

## 2021-12-17 PROCEDURE — 250N000009 HC RX 250: Performed by: UROLOGY

## 2021-12-17 PROCEDURE — 250N000013 HC RX MED GY IP 250 OP 250 PS 637: Performed by: PHYSICIAN ASSISTANT

## 2021-12-17 PROCEDURE — 272N000001 HC OR GENERAL SUPPLY STERILE: Performed by: UROLOGY

## 2021-12-17 PROCEDURE — 0TB14ZZ EXCISION OF LEFT KIDNEY, PERCUTANEOUS ENDOSCOPIC APPROACH: ICD-10-PCS | Performed by: UROLOGY

## 2021-12-17 RX ORDER — ACETAMINOPHEN 325 MG/1
650 TABLET ORAL
Status: DISCONTINUED | OUTPATIENT
Start: 2021-12-17 | End: 2021-12-19 | Stop reason: HOSPADM

## 2021-12-17 RX ORDER — SUCRALFATE 1 G/1
1 TABLET ORAL 4 TIMES DAILY
Status: DISCONTINUED | OUTPATIENT
Start: 2021-12-17 | End: 2021-12-19 | Stop reason: HOSPADM

## 2021-12-17 RX ORDER — SODIUM CHLORIDE, SODIUM LACTATE, POTASSIUM CHLORIDE, CALCIUM CHLORIDE 600; 310; 30; 20 MG/100ML; MG/100ML; MG/100ML; MG/100ML
INJECTION, SOLUTION INTRAVENOUS CONTINUOUS
Status: DISCONTINUED | OUTPATIENT
Start: 2021-12-17 | End: 2021-12-17 | Stop reason: HOSPADM

## 2021-12-17 RX ORDER — HYDROMORPHONE HCL IN WATER/PF 6 MG/30 ML
0.2 PATIENT CONTROLLED ANALGESIA SYRINGE INTRAVENOUS EVERY 5 MIN PRN
Status: DISCONTINUED | OUTPATIENT
Start: 2021-12-17 | End: 2021-12-17 | Stop reason: HOSPADM

## 2021-12-17 RX ORDER — HYDROMORPHONE HYDROCHLORIDE 4 MG/1
4 TABLET ORAL EVERY 4 HOURS PRN
Status: DISCONTINUED | OUTPATIENT
Start: 2021-12-17 | End: 2021-12-19 | Stop reason: HOSPADM

## 2021-12-17 RX ORDER — NALOXONE HYDROCHLORIDE 0.4 MG/ML
0.2 INJECTION, SOLUTION INTRAMUSCULAR; INTRAVENOUS; SUBCUTANEOUS
Status: DISCONTINUED | OUTPATIENT
Start: 2021-12-17 | End: 2021-12-19 | Stop reason: HOSPADM

## 2021-12-17 RX ORDER — SENNA AND DOCUSATE SODIUM 50; 8.6 MG/1; MG/1
1 TABLET, FILM COATED ORAL AT BEDTIME
Qty: 30 TABLET | Refills: 0 | Status: SHIPPED | OUTPATIENT
Start: 2021-12-17

## 2021-12-17 RX ORDER — DEXTROSE MONOHYDRATE 25 G/50ML
25-50 INJECTION, SOLUTION INTRAVENOUS
Status: DISCONTINUED | OUTPATIENT
Start: 2021-12-17 | End: 2021-12-19 | Stop reason: HOSPADM

## 2021-12-17 RX ORDER — NALOXONE HYDROCHLORIDE 0.4 MG/ML
0.4 INJECTION, SOLUTION INTRAMUSCULAR; INTRAVENOUS; SUBCUTANEOUS
Status: DISCONTINUED | OUTPATIENT
Start: 2021-12-17 | End: 2021-12-19 | Stop reason: HOSPADM

## 2021-12-17 RX ORDER — BUPIVACAINE HYDROCHLORIDE 2.5 MG/ML
INJECTION, SOLUTION EPIDURAL; INFILTRATION; INTRACAUDAL PRN
Status: DISCONTINUED | OUTPATIENT
Start: 2021-12-17 | End: 2021-12-17 | Stop reason: HOSPADM

## 2021-12-17 RX ORDER — FENTANYL CITRATE 50 UG/ML
25 INJECTION, SOLUTION INTRAMUSCULAR; INTRAVENOUS EVERY 5 MIN PRN
Status: DISCONTINUED | OUTPATIENT
Start: 2021-12-17 | End: 2021-12-17 | Stop reason: HOSPADM

## 2021-12-17 RX ORDER — NICOTINE POLACRILEX 4 MG
15-30 LOZENGE BUCCAL
Status: DISCONTINUED | OUTPATIENT
Start: 2021-12-17 | End: 2021-12-19 | Stop reason: HOSPADM

## 2021-12-17 RX ORDER — ONDANSETRON 4 MG/1
4 TABLET, ORALLY DISINTEGRATING ORAL EVERY 6 HOURS PRN
Status: DISCONTINUED | OUTPATIENT
Start: 2021-12-17 | End: 2021-12-19 | Stop reason: HOSPADM

## 2021-12-17 RX ORDER — TAMSULOSIN HYDROCHLORIDE 0.4 MG/1
0.4 CAPSULE ORAL AT BEDTIME
Status: DISCONTINUED | OUTPATIENT
Start: 2021-12-17 | End: 2021-12-19 | Stop reason: HOSPADM

## 2021-12-17 RX ORDER — HYDROMORPHONE HCL IN WATER/PF 6 MG/30 ML
0.4 PATIENT CONTROLLED ANALGESIA SYRINGE INTRAVENOUS
Status: DISCONTINUED | OUTPATIENT
Start: 2021-12-17 | End: 2021-12-19 | Stop reason: HOSPADM

## 2021-12-17 RX ORDER — ONDANSETRON 2 MG/ML
4 INJECTION INTRAMUSCULAR; INTRAVENOUS EVERY 30 MIN PRN
Status: DISCONTINUED | OUTPATIENT
Start: 2021-12-17 | End: 2021-12-17 | Stop reason: HOSPADM

## 2021-12-17 RX ORDER — HYDROMORPHONE HCL IN WATER/PF 6 MG/30 ML
0.2 PATIENT CONTROLLED ANALGESIA SYRINGE INTRAVENOUS
Status: DISCONTINUED | OUTPATIENT
Start: 2021-12-17 | End: 2021-12-19 | Stop reason: HOSPADM

## 2021-12-17 RX ORDER — ONDANSETRON 2 MG/ML
4 INJECTION INTRAMUSCULAR; INTRAVENOUS EVERY 6 HOURS PRN
Status: DISCONTINUED | OUTPATIENT
Start: 2021-12-17 | End: 2021-12-19 | Stop reason: HOSPADM

## 2021-12-17 RX ORDER — HYDROMORPHONE HYDROCHLORIDE 2 MG/1
2 TABLET ORAL EVERY 4 HOURS PRN
Status: DISCONTINUED | OUTPATIENT
Start: 2021-12-17 | End: 2021-12-19 | Stop reason: HOSPADM

## 2021-12-17 RX ORDER — ONDANSETRON 2 MG/ML
INJECTION INTRAMUSCULAR; INTRAVENOUS PRN
Status: DISCONTINUED | OUTPATIENT
Start: 2021-12-17 | End: 2021-12-17

## 2021-12-17 RX ORDER — PROPOFOL 10 MG/ML
INJECTION, EMULSION INTRAVENOUS CONTINUOUS PRN
Status: DISCONTINUED | OUTPATIENT
Start: 2021-12-17 | End: 2021-12-17

## 2021-12-17 RX ORDER — CEFAZOLIN SODIUM 2 G/100ML
2 INJECTION, SOLUTION INTRAVENOUS
Status: COMPLETED | OUTPATIENT
Start: 2021-12-17 | End: 2021-12-17

## 2021-12-17 RX ORDER — ACETAMINOPHEN 500 MG
1000 TABLET ORAL ONCE
Status: DISCONTINUED | OUTPATIENT
Start: 2021-12-17 | End: 2021-12-17 | Stop reason: HOSPADM

## 2021-12-17 RX ORDER — SODIUM CHLORIDE, SODIUM LACTATE, POTASSIUM CHLORIDE, CALCIUM CHLORIDE 600; 310; 30; 20 MG/100ML; MG/100ML; MG/100ML; MG/100ML
INJECTION, SOLUTION INTRAVENOUS CONTINUOUS
Status: DISCONTINUED | OUTPATIENT
Start: 2021-12-17 | End: 2021-12-19 | Stop reason: CLARIF

## 2021-12-17 RX ORDER — LIDOCAINE HYDROCHLORIDE 20 MG/ML
INJECTION, SOLUTION INFILTRATION; PERINEURAL PRN
Status: DISCONTINUED | OUTPATIENT
Start: 2021-12-17 | End: 2021-12-17

## 2021-12-17 RX ORDER — MAGNESIUM HYDROXIDE 1200 MG/15ML
LIQUID ORAL PRN
Status: DISCONTINUED | OUTPATIENT
Start: 2021-12-17 | End: 2021-12-17 | Stop reason: HOSPADM

## 2021-12-17 RX ORDER — CEFAZOLIN SODIUM 2 G/100ML
2 INJECTION, SOLUTION INTRAVENOUS SEE ADMIN INSTRUCTIONS
Status: DISCONTINUED | OUTPATIENT
Start: 2021-12-17 | End: 2021-12-17 | Stop reason: HOSPADM

## 2021-12-17 RX ORDER — GLYCOPYRROLATE 0.2 MG/ML
INJECTION, SOLUTION INTRAMUSCULAR; INTRAVENOUS PRN
Status: DISCONTINUED | OUTPATIENT
Start: 2021-12-17 | End: 2021-12-17

## 2021-12-17 RX ORDER — FENTANYL CITRATE 50 UG/ML
INJECTION, SOLUTION INTRAMUSCULAR; INTRAVENOUS PRN
Status: DISCONTINUED | OUTPATIENT
Start: 2021-12-17 | End: 2021-12-17

## 2021-12-17 RX ORDER — LABETALOL 20 MG/4 ML (5 MG/ML) INTRAVENOUS SYRINGE
PRN
Status: DISCONTINUED | OUTPATIENT
Start: 2021-12-17 | End: 2021-12-17

## 2021-12-17 RX ORDER — ONDANSETRON 4 MG/1
4 TABLET, ORALLY DISINTEGRATING ORAL EVERY 30 MIN PRN
Status: DISCONTINUED | OUTPATIENT
Start: 2021-12-17 | End: 2021-12-17 | Stop reason: HOSPADM

## 2021-12-17 RX ORDER — OXYCODONE HYDROCHLORIDE 5 MG/1
5 TABLET ORAL EVERY 4 HOURS PRN
Status: DISCONTINUED | OUTPATIENT
Start: 2021-12-17 | End: 2021-12-17 | Stop reason: HOSPADM

## 2021-12-17 RX ORDER — NEOSTIGMINE METHYLSULFATE 1 MG/ML
VIAL (ML) INJECTION PRN
Status: DISCONTINUED | OUTPATIENT
Start: 2021-12-17 | End: 2021-12-17

## 2021-12-17 RX ORDER — LIDOCAINE 40 MG/G
CREAM TOPICAL
Status: DISCONTINUED | OUTPATIENT
Start: 2021-12-17 | End: 2021-12-19 | Stop reason: HOSPADM

## 2021-12-17 RX ORDER — DOCUSATE SODIUM 100 MG/1
100 CAPSULE, LIQUID FILLED ORAL 2 TIMES DAILY
Status: DISCONTINUED | OUTPATIENT
Start: 2021-12-17 | End: 2021-12-19 | Stop reason: HOSPADM

## 2021-12-17 RX ORDER — PROPOFOL 10 MG/ML
INJECTION, EMULSION INTRAVENOUS PRN
Status: DISCONTINUED | OUTPATIENT
Start: 2021-12-17 | End: 2021-12-17

## 2021-12-17 RX ORDER — CALCIUM CARBONATE 500 MG/1
500 TABLET, CHEWABLE ORAL 4 TIMES DAILY PRN
Status: DISCONTINUED | OUTPATIENT
Start: 2021-12-17 | End: 2021-12-19 | Stop reason: HOSPADM

## 2021-12-17 RX ORDER — ATORVASTATIN CALCIUM 40 MG/1
40 TABLET, FILM COATED ORAL DAILY
Status: DISCONTINUED | OUTPATIENT
Start: 2021-12-17 | End: 2021-12-19 | Stop reason: HOSPADM

## 2021-12-17 RX ADMIN — HYDROMORPHONE HYDROCHLORIDE 0.2 MG: 0.2 INJECTION, SOLUTION INTRAMUSCULAR; INTRAVENOUS; SUBCUTANEOUS at 14:49

## 2021-12-17 RX ADMIN — ATORVASTATIN CALCIUM 40 MG: 40 TABLET, FILM COATED ORAL at 20:35

## 2021-12-17 RX ADMIN — SODIUM CHLORIDE, POTASSIUM CHLORIDE, SODIUM LACTATE AND CALCIUM CHLORIDE: 600; 310; 30; 20 INJECTION, SOLUTION INTRAVENOUS at 14:55

## 2021-12-17 RX ADMIN — LIDOCAINE HYDROCHLORIDE 30 ML: 20 SOLUTION ORAL; TOPICAL at 20:36

## 2021-12-17 RX ADMIN — ROCURONIUM BROMIDE 20 MG: 50 INJECTION, SOLUTION INTRAVENOUS at 09:01

## 2021-12-17 RX ADMIN — SUCRALFATE 1 G: 1 TABLET ORAL at 17:28

## 2021-12-17 RX ADMIN — MIDAZOLAM 2 MG: 1 INJECTION INTRAMUSCULAR; INTRAVENOUS at 07:43

## 2021-12-17 RX ADMIN — ROCURONIUM BROMIDE 20 MG: 50 INJECTION, SOLUTION INTRAVENOUS at 09:31

## 2021-12-17 RX ADMIN — HYDROMORPHONE HYDROCHLORIDE 0.2 MG: 0.2 INJECTION, SOLUTION INTRAMUSCULAR; INTRAVENOUS; SUBCUTANEOUS at 13:06

## 2021-12-17 RX ADMIN — FENTANYL CITRATE 100 MCG: 50 INJECTION, SOLUTION INTRAMUSCULAR; INTRAVENOUS at 07:51

## 2021-12-17 RX ADMIN — DOCUSATE SODIUM 100 MG: 100 CAPSULE, LIQUID FILLED ORAL at 20:35

## 2021-12-17 RX ADMIN — SUCRALFATE 1 G: 1 TABLET ORAL at 14:49

## 2021-12-17 RX ADMIN — LABETALOL 20 MG/4 ML (5 MG/ML) INTRAVENOUS SYRINGE 10 MG: at 09:13

## 2021-12-17 RX ADMIN — ONDANSETRON 4 MG: 2 INJECTION INTRAMUSCULAR; INTRAVENOUS at 11:15

## 2021-12-17 RX ADMIN — LIDOCAINE HYDROCHLORIDE 100 MG: 20 INJECTION, SOLUTION INFILTRATION; PERINEURAL at 07:51

## 2021-12-17 RX ADMIN — ROCURONIUM BROMIDE 20 MG: 50 INJECTION, SOLUTION INTRAVENOUS at 08:21

## 2021-12-17 RX ADMIN — CEFAZOLIN SODIUM 2 G: 2 INJECTION, SOLUTION INTRAVENOUS at 07:45

## 2021-12-17 RX ADMIN — PROPOFOL 150 MG: 10 INJECTION, EMULSION INTRAVENOUS at 07:51

## 2021-12-17 RX ADMIN — Medication 12 MCG: at 08:26

## 2021-12-17 RX ADMIN — HYDROMORPHONE HYDROCHLORIDE 2 MG: 2 TABLET ORAL at 16:52

## 2021-12-17 RX ADMIN — ROCURONIUM BROMIDE 20 MG: 50 INJECTION, SOLUTION INTRAVENOUS at 10:03

## 2021-12-17 RX ADMIN — ROCURONIUM BROMIDE 60 MG: 50 INJECTION, SOLUTION INTRAVENOUS at 07:51

## 2021-12-17 RX ADMIN — ACETAMINOPHEN 650 MG: 325 TABLET, FILM COATED ORAL at 19:40

## 2021-12-17 RX ADMIN — Medication 8 MCG: at 08:28

## 2021-12-17 RX ADMIN — GLYCOPYRROLATE 0.8 MG: 0.2 INJECTION, SOLUTION INTRAMUSCULAR; INTRAVENOUS at 11:20

## 2021-12-17 RX ADMIN — HYDROMORPHONE HYDROCHLORIDE 0.5 MG: 1 INJECTION, SOLUTION INTRAMUSCULAR; INTRAVENOUS; SUBCUTANEOUS at 08:30

## 2021-12-17 RX ADMIN — NEOSTIGMINE METHYLSULFATE 5 MG: 1 INJECTION, SOLUTION INTRAVENOUS at 11:20

## 2021-12-17 RX ADMIN — TAMSULOSIN HYDROCHLORIDE 0.4 MG: 0.4 CAPSULE ORAL at 21:34

## 2021-12-17 RX ADMIN — PROPOFOL 50 MCG/KG/MIN: 10 INJECTION, EMULSION INTRAVENOUS at 09:00

## 2021-12-17 RX ADMIN — SODIUM CHLORIDE, POTASSIUM CHLORIDE, SODIUM LACTATE AND CALCIUM CHLORIDE: 600; 310; 30; 20 INJECTION, SOLUTION INTRAVENOUS at 06:41

## 2021-12-17 RX ADMIN — ACETAMINOPHEN 650 MG: 325 TABLET, FILM COATED ORAL at 14:49

## 2021-12-17 RX ADMIN — ROCURONIUM BROMIDE 10 MG: 50 INJECTION, SOLUTION INTRAVENOUS at 11:09

## 2021-12-17 RX ADMIN — FENTANYL CITRATE 50 MCG: 50 INJECTION, SOLUTION INTRAMUSCULAR; INTRAVENOUS at 08:59

## 2021-12-17 RX ADMIN — HYDROMORPHONE HYDROCHLORIDE 2 MG: 2 TABLET ORAL at 22:14

## 2021-12-17 RX ADMIN — SODIUM CHLORIDE, POTASSIUM CHLORIDE, SODIUM LACTATE AND CALCIUM CHLORIDE: 600; 310; 30; 20 INJECTION, SOLUTION INTRAVENOUS at 08:45

## 2021-12-17 ASSESSMENT — ACTIVITIES OF DAILY LIVING (ADL)
ADLS_ACUITY_SCORE: 6
ADLS_ACUITY_SCORE: 12

## 2021-12-17 ASSESSMENT — MIFFLIN-ST. JEOR: SCORE: 1811.81

## 2021-12-17 ASSESSMENT — ENCOUNTER SYMPTOMS: SEIZURES: 0

## 2021-12-17 ASSESSMENT — LIFESTYLE VARIABLES: TOBACCO_USE: 1

## 2021-12-17 NOTE — PLAN OF CARE
Summary: POD 0 ROBOTIC ASSISTED LAPAROSCOPIC LEFT PARTIAL NEPHRECTOMY  Date/Time: 12/17/21 7230-8292  Orientation: A&Ox4  VS/ O2/ IV: VSS on 2 L O2 NC. PIV infusing LR 100ml/hr   Pain Management: c/o incisional pain 6/10 -dilaudid given x1 with relief   Drains/Devices: huerta patent with adequate italo urine   Skin: lap sites CDI   CMS: intact  Discharge/Plan: pending

## 2021-12-17 NOTE — PROGRESS NOTES
SPIRITUAL HEALTH SERVICES  SPIRITUAL ASSESSMENT Progress Note  [facility] Surg 2N     REFERRAL SOURCE: Admission Request    Introduced [facility] to patient who declined a visit at this time. He is aware of how to request a visit if desired.    PLAN: [facility] remains available for support.    Sherice Sampson  Associate   Pager [phone]  Phone [phone]

## 2021-12-17 NOTE — OP NOTE
DATE OF SURGERY: 12/17/21  PREOPERATIVE DIAGNOSIS:  Left renal mass.   POSTOPERATIVE DIAGNOSIS: Left renal mass.   PROCEDURE PERFORMED:   1) Left robotic-assisted laparoscopic partial nephrectomy;   2) Laparoscopic ultrasound of renal mass with intraoperative interpretation of imaging      STAFF SURGEON: Fabricio Gross MD, present and scrubbed for all critical portions of the procedure, including the entire radiographic portion.  RESIDENT SURGEON: Dori Richardson MD  ASSISTANT: Trudi Vanessa  ANESTHESIA: General.   ESTIMATED BLOOD LOSS: 100 mL.   DRAINS AND TUBES: Ann catheter  COMPLICATIONS: None.   DISPOSITION: PACU.   SPECIMENS OBTAINED:   ID Type Source Tests Collected by Time Destination   1 : BASE OF TUMOR BED LEFT KIDNEY Tissue Kidney, Left SURGICAL PATHOLOGY EXAM Fabricio Gross MD 12/17/2021 10:34 AM    2 : FÁTIMA-TUMOR FAT LEFT KIDNEY Tissue Kidney, Left SURGICAL PATHOLOGY EXAM Fabricio Gross MD 12/17/2021 10:37 AM    3 : LEFT KIDNEY MASS Tissue Kidney, Left SURGICAL PATHOLOGY EXAM Fabricio Gross MD 12/17/2021 11:16 AM      SIGNIFICANT FINDINGS: Tumor was resected with visually negative margins.  Frozen section from base of tumor bed was negative for malignancy.   HISTORY OF PRESENT ILLNESS: Sedrick Clements is a 71 year old mald with a history of a left-sided lower pole renal mass. After a discussion of all risks, benefits, and alternatives, the patient elected to undergo definitive management with a partial nephrectomy.  OPERATION PERFORMED:   Informed consent was obtained and the patient was brought to the operating room where general anesthesia was induced. He was given appropriate preoperative antibiotics and positioned in the full flank position where all pressure points were carefully padded and secured. He was then prepped and draped in the usual sterile fashion. We then performed a timeout, verifying the correct patient's site and procedure to be  performed.    Incision was made superior and lateral to the umbilicus a the lateral edge of the rectus. After confirmatory drop test, the Veress needle was used for insufflation. We placed a 12 mm assistant port and three 8 mm robotic ports. The robot was docked. The colon was reflected medially to expose the anterior surface of Gerota's fascia. The medial aspect of the kidney was exposed and the ureter and gonadal vein were identified. Kidney was lifted off the psoas muscle and dissection was carried posteriorly until the renal artery and vein were identified. The hilar vessels were carefully skeletonized. Once this was exposed, Gerota's was opened and the kidney was completely mobilized. The mass location was noted on the lower pole. Dissection was notable for extensive induration of the emile-renal fat. The ultrasound probe was then used to verify location of mass and outline the borders for dissection.  Artery was then clamped and excised the tumor using sharp dissection. Once the tumor was free, the base of the resection bed using was oversewn using 3-0 V-lock suture. At this point we unclamped the hilum; total clamp time was 15 minutes. The defect in the parenchyma was then closed using the interrupred 0-Vicryl sutures for capsular closure with sliding weck technique. A second weck placed for security on each stitch. Theses were placed over Surgicel and Floseal. Hemostasis appeared excellent. Gerota's fascia was re approximated over the kidney and resection bed.  Specimen was placed in a 10 mm EndoCatch bag.  Ports were removed. The specimen was removed and sent to pathology. We closed the fascia from the extraction site with 0-Vicryl in an interrupted fashion. Skin was re approximated using 4-0 Monocryl. The wounds were dressed with skin glue. The patient was then awakened and taken to the PACU in stable condition.  Fabricio Gross MD  Urology  [facility]

## 2021-12-17 NOTE — BRIEF OP NOTE
[facility]    Brief Operative Note    Pre-operative diagnosis: Left renal mass [N28.89]  Post-operative diagnosis Same as pre-operative diagnosis    Procedure: Procedure(s):  ROBOTIC ASSISTED LAPAROSCOPIC LEFT PARTIAL NEPHRECTOMY  Surgeon: Surgeon(s) and Role:     * Fabricio Gross MD - Primary  Anesthesia: General   Estimated Blood Loss: Less than 100 ml    Drains:  Ann  Specimens:   ID Type Source Tests Collected by Time Destination   1 : BASE OF TUMOR BED LEFT KIDNEY Tissue Kidney, Left SURGICAL PATHOLOGY EXAM Fabricio Gross MD 12/17/2021 10:34 AM    2 : FÁTIMA-TUMOR FAT LEFT KIDNEY Tissue Kidney, Left SURGICAL PATHOLOGY EXAM Fabricio Gross MD 12/17/2021 10:37 AM    3 : LEFT KIDNEY MASS Tissue Kidney, Left SURGICAL PATHOLOGY EXAM Fabricio Gross MD 12/17/2021 11:16 AM      Findings:   None.  Complications: None.  Implants: * No implants in log *

## 2021-12-17 NOTE — ANESTHESIA CARE TRANSFER NOTE
Patient: Sedrick Clements    Procedure: Procedure(s):  ROBOTIC ASSISTED LAPAROSCOPIC LEFT PARTIAL NEPHRECTOMY       Diagnosis: Left renal mass [N28.89]  Diagnosis Additional Information: No value filed.    Anesthesia Type:   General     Note:    Oropharynx: oral airway in place and spontaneously breathing  Level of Consciousness: drowsy  Oxygen Supplementation: face mask  Level of Supplemental Oxygen (L/min / FiO2): 6  Independent Airway: airway patency satisfactory and stable  Dentition: dentition unchanged  Vital Signs Stable: post-procedure vital signs reviewed and stable  Report to RN Given: handoff report given  Patient transferred to: PACU  Comments: Pt to PACU with O2 via mask and oral airway in place, airway patent, VSS. Report to RN.  Handoff Report: Identifed the Patient, Identified the Reponsible Provider, Reviewed the pertinent medical history, Discussed the surgical course, Reviewed Intra-OP anesthesia mangement and issues during anesthesia, Set expectations for post-procedure period and Allowed opportunity for questions and acknowledgement of understanding      Vitals:  Vitals Value Taken Time   /87 12/17/21 1150   Temp     Pulse 83 12/17/21 1150   Resp 29 12/17/21 1151   SpO2 99 % 12/17/21 1151   Vitals shown include unvalidated device data.    Electronically Signed By: SAFIA Arguelles CRNA  December 17, 2021  11:52 AM

## 2021-12-17 NOTE — ANESTHESIA PREPROCEDURE EVALUATION
Anesthesia Pre-Procedure Evaluation    Patient: Sedrick Clements   MRN: [id number] : 1950        Preoperative Diagnosis: Left renal mass [N28.89]    Procedure : Procedure(s):  ROBOTIC ASSISTED LAPAROSCOPIC LEFT PARTIAL NEPHRECTOMY POSSIBLE OPEN          Past Medical History:   Diagnosis Date     Cataract      Chronic gastritis      Diabetes (H)      Diverticulosis      Gallstones      Gastroesophageal reflux disease      Hiatal hernia      Peripheral neuropathy      Renal mass, left       Past Surgical History:   Procedure Laterality Date     BACK SURGERY       CHOLECYSTECTOMY       COLONOSCOPY       GASTROSCOPY        Allergies   Allergen Reactions     Insulins Hives     Pt not sure what kind of insulin, pt states he had hives from insulin  Pt not sure which insulin causes reaction.  States he had hives from insulin.       Lantus [Insulin Glargine]      Empagliflozin Other (See Comments)     frequent urination      Social History     Tobacco Use     Smoking status: Former Smoker     Quit date: 3/3/2012     Years since quittin.7     Smokeless tobacco: Not on file   Substance Use Topics     Alcohol use: Not Currently      Wt Readings from Last 1 Encounters:   21 105.6 kg (232 lb 14.4 oz)        Anesthesia Evaluation            ROS/MED HX  ENT/Pulmonary:     (+) tobacco use, Past use,     Neurologic:    (-) no seizures and no CVA   Cardiovascular:     (+) Dyslipidemia hypertension-----    METS/Exercise Tolerance:     Hematologic:    (-) anemia   Musculoskeletal:       GI/Hepatic:     (+) GERD, Asymptomatic on medication, hiatal hernia,     Renal/Genitourinary:       Endo:     (+) type II DM, Obesity,     Psychiatric/Substance Use:       Infectious Disease:       Malignancy:   (+) Malignancy (renal),     Other:            Physical Exam    Airway        Mallampati: III   TM distance: > 3 FB   Neck ROM: full   Mouth opening: > 3 cm    Respiratory Devices and Support         Dental     Comment: Loose left  upper molar (mild, patient has no concern of it close to becoming dislodged)    (+) missing and loose      Cardiovascular          Rhythm and rate: regular and normal     Pulmonary   pulmonary exam normal                OUTSIDE LABS:  CBC: No results found for: WBC, HGB, HCT, PLT  BMP:   Lab Results   Component Value Date    CR 1.0 09/02/2021     COAGS: No results found for: PTT, INR, FIBR  POC: No results found for: BGM, HCG, HCGS  HEPATIC: No results found for: ALBUMIN, PROTTOTAL, ALT, AST, GGT, ALKPHOS, BILITOTAL, BILIDIRECT, YOBANI  OTHER: No results found for: PH, LACT, A1C, MATTHEW, PHOS, MAG, LIPASE, AMYLASE, TSH, T4, T3, CRP, SED    Anesthesia Plan    ASA Status:  2   NPO Status:  NPO Appropriate    Anesthesia Type: General.     - Airway: ETT   Induction: Intravenous, Propofol.   Maintenance: Inhalation.   Techniques and Equipment:     - Airway: Video-Laryngoscope     - Lines/Monitors: 2nd IV     Consents    Anesthesia Plan(s) and associated risks, benefits, and realistic alternatives discussed. Questions answered and patient/representative(s) expressed understanding.    - Discussed:     - Discussed with:  Patient         Postoperative Care    Pain management: IV analgesics, Oral pain medications.   PONV prophylaxis: Ondansetron (or other 5HT-3), Dexamethasone or Solumedrol     Comments:                Jasmeet Mc MD

## 2021-12-17 NOTE — ANESTHESIA POSTPROCEDURE EVALUATION
Patient: Sedrick Clements    Procedure: Procedure(s):  ROBOTIC ASSISTED LAPAROSCOPIC LEFT PARTIAL NEPHRECTOMY       Diagnosis:Left renal mass [N28.89]  Diagnosis Additional Information: No value filed.    Anesthesia Type:  General    Note:  Disposition: Inpatient   Postop Pain Control: Uneventful            Sign Out: Well controlled pain   PONV: No   Neuro/Psych: Uneventful            Sign Out: Acceptable/Baseline neuro status   Airway/Respiratory: Uneventful            Sign Out: Acceptable/Baseline resp. status   CV/Hemodynamics: Uneventful            Sign Out: Acceptable CV status   Other NRE: NONE   DID A NON-ROUTINE EVENT OCCUR? No           Last vitals:  Vitals Value Taken Time   /83 12/17/21 1230   Temp 35.9  C (96.7  F) 12/17/21 1148   Pulse 83 12/17/21 1239   Resp 18 12/17/21 1239   SpO2 97 % 12/17/21 1239   Vitals shown include unvalidated device data.    Electronically Signed By: Jasmeet Mc MD  December 17, 2021  12:40 PM

## 2021-12-18 LAB
ANION GAP SERPL CALCULATED.3IONS-SCNC: 6 MMOL/L (ref 3–14)
BUN SERPL-MCNC: 16 MG/DL (ref 7–30)
CALCIUM SERPL-MCNC: 8.6 MG/DL (ref 8.5–10.1)
CHLORIDE BLD-SCNC: 103 MMOL/L (ref 94–109)
CO2 SERPL-SCNC: 25 MMOL/L (ref 20–32)
CREAT SERPL-MCNC: 0.88 MG/DL (ref 0.66–1.25)
ERYTHROCYTE [DISTWIDTH] IN BLOOD BY AUTOMATED COUNT: 11.7 % (ref 10–15)
GFR SERPL CREATININE-BSD FRML MDRD: 88 ML/MIN/1.73M2
GLUCOSE BLD-MCNC: 156 MG/DL (ref 70–99)
GLUCOSE BLDC GLUCOMTR-MCNC: 114 MG/DL (ref 70–99)
GLUCOSE BLDC GLUCOMTR-MCNC: 134 MG/DL (ref 70–99)
GLUCOSE BLDC GLUCOMTR-MCNC: 146 MG/DL (ref 70–99)
GLUCOSE BLDC GLUCOMTR-MCNC: 151 MG/DL (ref 70–99)
GLUCOSE BLDC GLUCOMTR-MCNC: 152 MG/DL (ref 70–99)
HCT VFR BLD AUTO: 43.2 % (ref 40–53)
HGB BLD-MCNC: 14.4 G/DL (ref 13.3–17.7)
MCH RBC QN AUTO: 29.1 PG (ref 26.5–33)
MCHC RBC AUTO-ENTMCNC: 33.3 G/DL (ref 31.5–36.5)
MCV RBC AUTO: 87 FL (ref 78–100)
PLATELET # BLD AUTO: 207 10E3/UL (ref 150–450)
POTASSIUM BLD-SCNC: 3.7 MMOL/L (ref 3.4–5.3)
RBC # BLD AUTO: 4.94 10E6/UL (ref 4.4–5.9)
SODIUM SERPL-SCNC: 134 MMOL/L (ref 133–144)
WBC # BLD AUTO: 12.9 10E3/UL (ref 4–11)

## 2021-12-18 PROCEDURE — 96372 THER/PROPH/DIAG INJ SC/IM: CPT | Performed by: PHYSICIAN ASSISTANT

## 2021-12-18 PROCEDURE — 82962 GLUCOSE BLOOD TEST: CPT

## 2021-12-18 PROCEDURE — 250N000013 HC RX MED GY IP 250 OP 250 PS 637: Performed by: STUDENT IN AN ORGANIZED HEALTH CARE EDUCATION/TRAINING PROGRAM

## 2021-12-18 PROCEDURE — 250N000011 HC RX IP 250 OP 636: Performed by: STUDENT IN AN ORGANIZED HEALTH CARE EDUCATION/TRAINING PROGRAM

## 2021-12-18 PROCEDURE — 36415 COLL VENOUS BLD VENIPUNCTURE: CPT | Performed by: STUDENT IN AN ORGANIZED HEALTH CARE EDUCATION/TRAINING PROGRAM

## 2021-12-18 PROCEDURE — 250N000012 HC RX MED GY IP 250 OP 636 PS 637: Performed by: PHYSICIAN ASSISTANT

## 2021-12-18 PROCEDURE — 120N000001 HC R&B MED SURG/OB

## 2021-12-18 PROCEDURE — 258N000003 HC RX IP 258 OP 636: Performed by: STUDENT IN AN ORGANIZED HEALTH CARE EDUCATION/TRAINING PROGRAM

## 2021-12-18 PROCEDURE — 99231 SBSQ HOSP IP/OBS SF/LOW 25: CPT | Performed by: PHYSICIAN ASSISTANT

## 2021-12-18 PROCEDURE — 85027 COMPLETE CBC AUTOMATED: CPT | Performed by: STUDENT IN AN ORGANIZED HEALTH CARE EDUCATION/TRAINING PROGRAM

## 2021-12-18 PROCEDURE — 80048 BASIC METABOLIC PNL TOTAL CA: CPT | Performed by: STUDENT IN AN ORGANIZED HEALTH CARE EDUCATION/TRAINING PROGRAM

## 2021-12-18 RX ORDER — OXYCODONE HYDROCHLORIDE 5 MG/1
5-10 TABLET ORAL EVERY 4 HOURS PRN
Status: DISCONTINUED | OUTPATIENT
Start: 2021-12-18 | End: 2021-12-19 | Stop reason: HOSPADM

## 2021-12-18 RX ORDER — KETOROLAC TROMETHAMINE 15 MG/ML
15 INJECTION, SOLUTION INTRAMUSCULAR; INTRAVENOUS EVERY 6 HOURS PRN
Status: DISCONTINUED | OUTPATIENT
Start: 2021-12-18 | End: 2021-12-19 | Stop reason: HOSPADM

## 2021-12-18 RX ADMIN — ONDANSETRON 4 MG: 2 INJECTION INTRAMUSCULAR; INTRAVENOUS at 02:55

## 2021-12-18 RX ADMIN — SODIUM CHLORIDE, POTASSIUM CHLORIDE, SODIUM LACTATE AND CALCIUM CHLORIDE: 600; 310; 30; 20 INJECTION, SOLUTION INTRAVENOUS at 21:16

## 2021-12-18 RX ADMIN — ACETAMINOPHEN 650 MG: 325 TABLET, FILM COATED ORAL at 13:06

## 2021-12-18 RX ADMIN — ACETAMINOPHEN 650 MG: 325 TABLET, FILM COATED ORAL at 20:27

## 2021-12-18 RX ADMIN — SUCRALFATE 1 G: 1 TABLET ORAL at 22:10

## 2021-12-18 RX ADMIN — DOCUSATE SODIUM 100 MG: 100 CAPSULE, LIQUID FILLED ORAL at 20:27

## 2021-12-18 RX ADMIN — HYDROMORPHONE HYDROCHLORIDE 2 MG: 2 TABLET ORAL at 02:45

## 2021-12-18 RX ADMIN — INSULIN ASPART 1 UNITS: 100 INJECTION, SOLUTION INTRAVENOUS; SUBCUTANEOUS at 08:22

## 2021-12-18 RX ADMIN — ATORVASTATIN CALCIUM 40 MG: 40 TABLET, FILM COATED ORAL at 20:27

## 2021-12-18 RX ADMIN — SUCRALFATE 1 G: 1 TABLET ORAL at 08:24

## 2021-12-18 RX ADMIN — ACETAMINOPHEN 650 MG: 325 TABLET, FILM COATED ORAL at 08:24

## 2021-12-18 RX ADMIN — SODIUM CHLORIDE, POTASSIUM CHLORIDE, SODIUM LACTATE AND CALCIUM CHLORIDE: 600; 310; 30; 20 INJECTION, SOLUTION INTRAVENOUS at 11:11

## 2021-12-18 RX ADMIN — TAMSULOSIN HYDROCHLORIDE 0.4 MG: 0.4 CAPSULE ORAL at 22:10

## 2021-12-18 RX ADMIN — SODIUM CHLORIDE, POTASSIUM CHLORIDE, SODIUM LACTATE AND CALCIUM CHLORIDE: 600; 310; 30; 20 INJECTION, SOLUTION INTRAVENOUS at 01:03

## 2021-12-18 RX ADMIN — SUCRALFATE 1 G: 1 TABLET ORAL at 13:06

## 2021-12-18 RX ADMIN — DOCUSATE SODIUM 100 MG: 100 CAPSULE, LIQUID FILLED ORAL at 08:24

## 2021-12-18 RX ADMIN — SUCRALFATE 1 G: 1 TABLET ORAL at 18:54

## 2021-12-18 RX ADMIN — CALCIUM CARBONATE (ANTACID) CHEW TAB 500 MG 500 MG: 500 CHEW TAB at 02:45

## 2021-12-18 ASSESSMENT — ACTIVITIES OF DAILY LIVING (ADL)
ADLS_ACUITY_SCORE: 8

## 2021-12-18 NOTE — PLAN OF CARE
Summary: POD 1 ROBOTIC ASSISTED LAPAROSCOPIC LEFT PARTIAL NEPHRECTOMY  Date/Time: 12/18/21 2341-8127  Orientation: A&Ox4  GI: BS hypoactive, no flatus per pt report   Diet: tolerating clear liquids   VS/ O2/ IV: VSS on RA. PIV infusing LR 100ml/hr   Pain Management:  Drains/Devices: huerta removed at 1100 - pt due to void   Skin: lap sites CDI with liquid bandages   CMS: intact  Discharge/Plan: pending

## 2021-12-18 NOTE — PLAN OF CARE
POD 0 from a Robotic assisted Laparascopic L partial Nephrectomy. A&Ox4. CMS intact. Bowel sounds audible, - flatus, tolerating clear liquid diet. Ann is patent. VSS on 1LMP NC, except HTN. Dressing  CDI on 4x lap sites. Ambulation declined for this shift. C/o abdominal pain, decreased with PRN tylenol and dilaudid. Pt scoring green on the Aggression Stop Light Tool. Plan to continue current care. Discharge pending.

## 2021-12-18 NOTE — PROGRESS NOTES
"[facility]  Hospitalist Progress Note  Date of Service (when I saw the patient): 12/18/2021    Summary:  Sedrick Clements is a 71 year old male admitted to the urology service on 12/17/2021 following surgery. Hospitalist service consulted for medical management.      Past medical history significant for Left renal mass, DM2, Peripheral Neuropathy, HLP, GERD, Hiatal hernia, Chronic gastritis, Constipation, BPH, History of Cholelithiasis who underwent an elective left partial nephrectomy 12/17/21.      Assessment & Plan:  Left renal mass s/p left partial nephrectomy  POD#1  - Urology is managing.               --Defer analgesic management, DVT prophylaxis, PT/OT.    - HGB check this morning 14.4    - Encourage use of incentive spirometer  - Encourage ambulation      DM2  Peripheral Neuropathy  A1c of 8.5% from 11/16/2021.  PTA on Ozempic 0.5 mg subcutaneous q. 7 days on Mondays, and glipizide 10 mg twice daily.  Tresiba 20 units daily is on his medication list, however he states he has not yet started taking this.  He has a reported \"allergy\" to Lantus on file.  --Ordered moderate dose sliding scale NovoLog while hospitalized  --PTA Ozempic and glipizide are held, can likely resume at discharge  --Monitor 4 times daily Accu-Cheks  --Advance to carbohydrate controlled diet as able    HLP, GERD  Hiatal hernia  Chronic gastritis  --Resume PTA Lipitor  --Resume PTA Carafate     Constipation  --Bowel regimen as needed per urology post-op  --Discussed that this will be a concern post-op. Particularly with narcotic pain meds.     BPH  --Continue Flomax     History of Cholelithiasis  S/p cholecystectomy.        DVT Prophylaxis: Pneumatic Compression Devices and Defer to primary service  Code Status: Full Code  Disposition: Expected discharge per primary service    The patient's care was discussed with the Attending Physician, Dr. Xavier, Bedside Nurse, and Patient.    Dot Gamez PA-C      Interval " History   Pt found resting in bed. Pt reports RLQ pain, otherwise abdomen not painful at rest. Has not been out of bed yet today. Not passing gas. Some nausea today, no vomiting. Tolerating huerta well.     -Data reviewed today: I reviewed all new labs and imaging results over the last 24 hours. I personally reviewed no images or EKG's today.    Physical Exam   Temp: 99.5  F (37.5  C) Temp src: Oral BP: 139/67 Pulse: 98   Resp: 20 SpO2: 90 % O2 Device: None (Room air) Oxygen Delivery: 2 LPM  Vitals:    12/17/21 0609   Weight: 105.6 kg (232 lb 14.4 oz)     Vital Signs with Ranges  Temp:  [96.7  F (35.9  C)-99.5  F (37.5  C)] 99.5  F (37.5  C)  Pulse:  [] 98  Resp:  [10-20] 20  BP: (138-159)/(60-94) 139/67  SpO2:  [90 %-100 %] 90 %  I/O last 3 completed shifts:  In: 2500 [P.O.:200; I.V.:2300]  Out: 960 [Urine:760; Emesis/NG output:100; Blood:100]    Constitutional: alert, oriented, no acute distress  Eyes: No scleral icterus or injection  ENT: Normocephalic, atraumatic, moist mucus membranes  Respiratory: No secondary muscle use or labored breathing. Lungs clear to auscultation bilaterally without wheezes or rhonchi   Cardiovascular: RRR without murmur  GI: Abdomen soft, appropriately tender to palpation, non-distended.  Bowel sounds hypoactive  MSK: able to move extremities on command without weakness, walks without weakness or ataxia  Skin/Integumen: warm, dry, in tact without rash or hives  Vascular: No calf tenderness, PCDs in place    Medications     lactated ringers 100 mL/hr at 12/18/21 0103       acetaminophen  650 mg Oral Q6H WA     atorvastatin  40 mg Oral Daily     docusate sodium  100 mg Oral BID     insulin aspart  1-7 Units Subcutaneous TID AC     insulin aspart  1-5 Units Subcutaneous At Bedtime     sodium chloride (PF)  3 mL Intracatheter Q8H     sucralfate  1 g Oral 4x Daily     tamsulosin  0.4 mg Oral At Bedtime       Data   Recent Labs   Lab 12/18/21  0626 12/18/21  0620 12/17/21  215  12/17/21  1636 12/17/21  1224 12/17/21  1013 12/17/21  0611   WBC  --  12.9*  --   --  16.1*  --   --    HGB  --  14.4  --   --  14.8  --   --    MCV  --  87  --   --  89  --   --    PLT  --  207  --   --  221  --   --    NA  --  134  --   --  134  --   --    POTASSIUM  --  3.7  --   --  4.4  --  3.7   CHLORIDE  --  103  --   --  105  --   --    CO2  --  25  --   --  25  --   --    BUN  --  16  --   --  19  --   --    CR  --  0.88  --   --  1.15  --  0.86   ANIONGAP  --  6  --   --  4  --   --    MATTHEW  --  8.6  --   --  8.4*  --   --    * 156* 151*   < > 207*   < > 142*    < > = values in this interval not displayed.       No results found for this or any previous visit (from the past 24 hour(s)).

## 2021-12-18 NOTE — UTILIZATION REVIEW
Admission Status; Secondary Review Determination         Under the authority of the Utilization Management Committee, the utilization review process indicated a secondary review on the above patient.  The review outcome is based on review of the medical records, discussions with staff, and applying clinical experience noted on the date of the review.        (x)      Inpatient Status Appropriate - This patient's medical care is consistent with medical management for inpatient care and reasonable inpatient medical practice.      RATIONALE FOR DETERMINATION   The patient is a 71-year-old male admitted on 12/17/2021.  The patient has a diagnosis of renal cell mass and required a left partial nephrectomy.  The patient has had postop vomiting and abdominal distention and will require at least an additional midnight stay to stabilize prior to discharge.  Current status is outpatient and recommendation is to switch this to inpatient based on additional complications and need for longer stay in the hospital.  An [employer] paging text to be sent to Dr. Nunes to make this change.      The severity of illness, intensity of service provided, expected LOS and risk for adverse outcome make the care complex, high risk and appropriate for hospital admission.        The information on this document is developed by the utilization review team in order for the business office to ensure compliance.  This only denotes the appropriateness of proper admission status and does not reflect the quality of care rendered.         The definitions of Inpatient Status and Observation Status used in making the determination above are those provided in the CMS Coverage Manual, Chapter 1 and Chapter 6, section 70.4.      Sincerely,     Colton Mata MD  Physician Advisor  Utilization Review/ Case Management  [facility].

## 2021-12-18 NOTE — PROGRESS NOTES
Pt POD #1 from robotic assisted laparoscopic L partial nephrectomy. A&O x4, calm. VSS on RA, capno remains in place. Pt sleeps with HOB raised; SALAZAR. BS hypoactive, no flatus per pt report. Tolerating clear liquid diet. Ann is patent and yellow/straw colored urine. 4 Lap sited to abdomen CDI with liquid bandages. D/t ambulate. Pain managed with warm pack, pillows, repositioning, PRN Dilaudid, and 1:1. One small emesis this shift, after PRN PO dilaudid administration. IV Zofran effective in managing. Continuous LR running per orders. Discharge pending.

## 2021-12-18 NOTE — PROGRESS NOTES
Urology Daily Progress Note    24 hour events/Subjective:     - Pain well controlled on current regimen, but has had vomitting after pain meds   - taking sips on;y 1 episode of  emesis last night   - Not yet passing flatus.   - Up to chair today   - complains of abdominal distension and discomfort    O:  Vitals: Afebrile, VSS  General: Alert, interactive, in NAD  Resp: Non-labored breathing on RA  Abdomen: Soft, appropriately-tender, mild-moderately distended. Incision(s) c/d/i w/ dermabond; no erythema or drainage.   Ext: Warm and well perfused, No LE edema   Huerta: Bharti       I/O last 3 completed shifts:  In: 2500 [P.O.:200; I.V.:2300]  Out: 960 [Urine:760; Emesis/NG output:100; Blood:100] - Last 24 hours    I/O this shift:  In: -   Out: 950 [Urine:950] - Since Midnight      Labs/Imaging  Heme:Recent Labs   Lab 12/18/21  0620 12/17/21  1224   WBC 12.9* 16.1*   HGB 14.4 14.8    221     Chem:  Recent Labs   Lab 12/18/21  0620 12/17/21  1224 12/17/21  0611   POTASSIUM 3.7 4.4 3.7   CR 0.88 1.15 0.86       Assessment/Plan  71 year old y/o male POD#1  s/p Robot Assisted Partial Nephrectomy. Doing well    NEURO Issues with distension and nausea/ vomitting with dilaudid, switched over to oral OXY, Toradol PRN IV for additional pain control     CV HDS.    PULM Aggressive pulmonary toilet    FEN/GI Sips to clear liquids as tolerated, IVF to continue     Remove huerta catheter   HEME Hgb as above.     ID Afebrile, no leukocytosis.    Antibiotics: Completed   ENDO DM being managed by hospitalist service   ACTIVITY Up as tolerated  Ambulate at least TID    PPx SCDs, ambulation   DISPO Possibly tomorrow, pending resolution of distension and advancement of diet.         --    Quan Miller MD, MD  [facility]

## 2021-12-19 VITALS
DIASTOLIC BLOOD PRESSURE: 77 MMHG | BODY MASS INDEX: 34.49 KG/M2 | HEIGHT: 69 IN | TEMPERATURE: 97.9 F | RESPIRATION RATE: 17 BRPM | WEIGHT: 232.9 LBS | OXYGEN SATURATION: 94 % | HEART RATE: 89 BPM | SYSTOLIC BLOOD PRESSURE: 150 MMHG

## 2021-12-19 LAB
ANION GAP SERPL CALCULATED.3IONS-SCNC: 5 MMOL/L (ref 3–14)
BUN SERPL-MCNC: 14 MG/DL (ref 7–30)
CALCIUM SERPL-MCNC: 8.2 MG/DL (ref 8.5–10.1)
CHLORIDE BLD-SCNC: 102 MMOL/L (ref 94–109)
CO2 SERPL-SCNC: 26 MMOL/L (ref 20–32)
CREAT SERPL-MCNC: 0.88 MG/DL (ref 0.66–1.25)
ERYTHROCYTE [DISTWIDTH] IN BLOOD BY AUTOMATED COUNT: 11.6 % (ref 10–15)
GFR SERPL CREATININE-BSD FRML MDRD: 88 ML/MIN/1.73M2
GLUCOSE BLD-MCNC: 128 MG/DL (ref 70–99)
GLUCOSE BLDC GLUCOMTR-MCNC: 121 MG/DL (ref 70–99)
GLUCOSE BLDC GLUCOMTR-MCNC: 129 MG/DL (ref 70–99)
GLUCOSE BLDC GLUCOMTR-MCNC: 134 MG/DL (ref 70–99)
GLUCOSE BLDC GLUCOMTR-MCNC: 136 MG/DL (ref 70–99)
HCT VFR BLD AUTO: 42.1 % (ref 40–53)
HGB BLD-MCNC: 13.9 G/DL (ref 13.3–17.7)
MCH RBC QN AUTO: 28.8 PG (ref 26.5–33)
MCHC RBC AUTO-ENTMCNC: 33 G/DL (ref 31.5–36.5)
MCV RBC AUTO: 87 FL (ref 78–100)
PLATELET # BLD AUTO: 182 10E3/UL (ref 150–450)
POTASSIUM BLD-SCNC: 3.6 MMOL/L (ref 3.4–5.3)
RBC # BLD AUTO: 4.82 10E6/UL (ref 4.4–5.9)
SODIUM SERPL-SCNC: 133 MMOL/L (ref 133–144)
WBC # BLD AUTO: 11.7 10E3/UL (ref 4–11)

## 2021-12-19 PROCEDURE — 36415 COLL VENOUS BLD VENIPUNCTURE: CPT | Performed by: STUDENT IN AN ORGANIZED HEALTH CARE EDUCATION/TRAINING PROGRAM

## 2021-12-19 PROCEDURE — 80048 BASIC METABOLIC PNL TOTAL CA: CPT | Performed by: STUDENT IN AN ORGANIZED HEALTH CARE EDUCATION/TRAINING PROGRAM

## 2021-12-19 PROCEDURE — 258N000003 HC RX IP 258 OP 636: Performed by: STUDENT IN AN ORGANIZED HEALTH CARE EDUCATION/TRAINING PROGRAM

## 2021-12-19 PROCEDURE — 85027 COMPLETE CBC AUTOMATED: CPT | Performed by: STUDENT IN AN ORGANIZED HEALTH CARE EDUCATION/TRAINING PROGRAM

## 2021-12-19 PROCEDURE — 99231 SBSQ HOSP IP/OBS SF/LOW 25: CPT | Performed by: PHYSICIAN ASSISTANT

## 2021-12-19 PROCEDURE — 250N000011 HC RX IP 250 OP 636: Performed by: STUDENT IN AN ORGANIZED HEALTH CARE EDUCATION/TRAINING PROGRAM

## 2021-12-19 PROCEDURE — 99207 PR MOONLIGHTING INDICATOR: CPT | Performed by: PHYSICIAN ASSISTANT

## 2021-12-19 PROCEDURE — 250N000013 HC RX MED GY IP 250 OP 250 PS 637: Performed by: STUDENT IN AN ORGANIZED HEALTH CARE EDUCATION/TRAINING PROGRAM

## 2021-12-19 RX ORDER — OXYCODONE HYDROCHLORIDE 5 MG/1
10 TABLET ORAL EVERY 6 HOURS PRN
Qty: 20 TABLET | Refills: 0 | Status: SHIPPED | OUTPATIENT
Start: 2021-12-19 | End: 2021-12-22

## 2021-12-19 RX ORDER — ONDANSETRON 4 MG/1
4 TABLET, ORALLY DISINTEGRATING ORAL EVERY 8 HOURS PRN
Qty: 20 TABLET | Refills: 0 | Status: SHIPPED | OUTPATIENT
Start: 2021-12-19 | End: 2021-12-26

## 2021-12-19 RX ORDER — SENNA AND DOCUSATE SODIUM 50; 8.6 MG/1; MG/1
1 TABLET, FILM COATED ORAL AT BEDTIME
Qty: 15 TABLET | Refills: 0 | Status: SHIPPED | OUTPATIENT
Start: 2021-12-19 | End: 2022-01-03

## 2021-12-19 RX ORDER — ONDANSETRON 4 MG/1
4 TABLET, FILM COATED ORAL EVERY 8 HOURS PRN
Qty: 20 TABLET | Refills: 0 | Status: SHIPPED | OUTPATIENT
Start: 2021-12-19 | End: 2021-12-26

## 2021-12-19 RX ORDER — OXYCODONE HYDROCHLORIDE 5 MG/1
10 TABLET ORAL EVERY 6 HOURS PRN
Qty: 20 TABLET | Refills: 0 | Status: SHIPPED | OUTPATIENT
Start: 2021-12-19 | End: 2021-12-24

## 2021-12-19 RX ADMIN — ACETAMINOPHEN 650 MG: 325 TABLET, FILM COATED ORAL at 08:28

## 2021-12-19 RX ADMIN — SODIUM CHLORIDE, POTASSIUM CHLORIDE, SODIUM LACTATE AND CALCIUM CHLORIDE: 600; 310; 30; 20 INJECTION, SOLUTION INTRAVENOUS at 10:20

## 2021-12-19 RX ADMIN — SUCRALFATE 1 G: 1 TABLET ORAL at 13:38

## 2021-12-19 RX ADMIN — DOCUSATE SODIUM 100 MG: 100 CAPSULE, LIQUID FILLED ORAL at 08:28

## 2021-12-19 RX ADMIN — SUCRALFATE 1 G: 1 TABLET ORAL at 17:20

## 2021-12-19 RX ADMIN — ACETAMINOPHEN 650 MG: 325 TABLET, FILM COATED ORAL at 13:38

## 2021-12-19 RX ADMIN — ONDANSETRON 4 MG: 4 TABLET, ORALLY DISINTEGRATING ORAL at 02:26

## 2021-12-19 RX ADMIN — SUCRALFATE 1 G: 1 TABLET ORAL at 08:28

## 2021-12-19 ASSESSMENT — ACTIVITIES OF DAILY LIVING (ADL)
ADLS_ACUITY_SCORE: 6
ADLS_ACUITY_SCORE: 6
ADLS_ACUITY_SCORE: 8
ADLS_ACUITY_SCORE: 8
ADLS_ACUITY_SCORE: 6
ADLS_ACUITY_SCORE: 8
ADLS_ACUITY_SCORE: 6
ADLS_ACUITY_SCORE: 8
ADLS_ACUITY_SCORE: 6
ADLS_ACUITY_SCORE: 8
ADLS_ACUITY_SCORE: 6

## 2021-12-19 NOTE — PLAN OF CARE
Summary: POD 2 ROBOTIC ASSISTED LAPAROSCOPIC LEFT PARTIAL NEPHRECTOMY  Date/Time: 12/19/21 4807-2434  Orientation: A&Ox4  Diet: tolerating regular diet   GI: denies nausea/vomiting   VS/ O2/ IV: VSS on RA. PIV SL   Pain Management: rates pain 3/10 - declines medication   Drains/Devices: voiding spontaneously - no difficulty   Skin: lap sites CDI with liquid bandages   CMS: intact  Discharge/Plan: this afternoon

## 2021-12-19 NOTE — PLAN OF CARE
POD 1 from a Robotic assisted Laparascopic L partial Nephrectomy. A&Ox4. CMS intact. Bowel sounds audible, - flatus, voids adequately, tolerating clear liquid diet. VSS on RA, except HTN. Dressing  CDI on 4x lap sites. C/o abdominal pain, meds declined. Pt scoring green on the Aggression Stop Light Tool. Plan to continue current care. Discharge pending.

## 2021-12-19 NOTE — PROGRESS NOTES
"[facility]  Hospitalist Progress Note  Date of Service (when I saw the patient): 12/19/2021    Summary:  Sedrick Clements is a 71 year old male admitted to the urology service on 12/17/2021 following surgery. Hospitalist service consulted for medical management.      Past medical history significant for Left renal mass, DM2, Peripheral Neuropathy, HLP, GERD, Hiatal hernia, Chronic gastritis, Constipation, BPH, History of Cholelithiasis who underwent an elective left partial nephrectomy 12/17/21.       Assessment & Plan:  Left renal mass s/p left partial nephrectomy  POD#2  - Urology is managing.               --Defer analgesic management, DVT prophylaxis, PT/OT.    - HGB check this morning 14.4    - Encourage use of incentive spirometer  - Encourage ambulation      DM2  Peripheral Neuropathy  A1c of 8.5% from 11/16/2021.  PTA on Ozempic 0.5 mg subcutaneous q. 7 days on Mondays, and glipizide 10 mg twice daily.  Tresiba 20 units daily is on his medication list, however he states he has not yet started taking this.  He has a reported \"allergy\" to Lantus on file.  --Ordered moderate dose sliding scale NovoLog while hospitalized  --PTA Ozempic and glipizide are held, can resume at discharge  --Monitor 4 times daily Accu-Cheks  --Advance to carbohydrate controlled diet as able     HLP, GERD  Hiatal hernia  Chronic gastritis  --Resume PTA Lipitor  --Resume PTA Carafate  -Having post-op nausea mostly controlled with antiemetics though worsened when meds wear off. Mildly hypertensive this morning, though pt attributes to nausea, vomiting and pt denies history of HTN. Continue to monitor and plan for BP check with PCP after discharge  -Pt also complaining of dry mouth. Feels this is really bothersome, so he drinks a lot of water and this upsets his stomach. Will order PRN lozenges to help with the dry mouth     Constipation  --Bowel regimen as needed per urology post-op  --Discussed that this will be " a concern post-op. Particularly with narcotic pain meds.  --Passing flatus overnight POD 1-2     BPH  --Continue Flomax     History of Cholelithiasis  S/p cholecystectomy.      DVT Prophylaxis: Pneumatic Compression Devices and Defer to primary service  Code Status: Full Code  Disposition: Expected discharge per primary service    The patient's care was discussed with the Attending Physician, Dr. Mast, Bedside Nurse, and Patient.    Dot Gamez PA-GIANFRANCO      Interval History   Pt found resting in bed. Main complaints today are back pain from the bed, dry mouth, and intermittent nausea. Pt also still recovering from surgery, so not feeling great overall. Pt continues to have intermittent nausea  mostly controlled with antiemetics though worsened when meds wear off. Denies fevers, chills, chest pain, or difficulty breathing. Passing a lot of flatus now. Ambulating independently in room and voiding well.     -Data reviewed today: I reviewed all new labs and imaging results over the last 24 hours. I personally reviewed no images or EKG's today.    Physical Exam   Temp: 97.2  F (36.2  C) Temp src: Oral BP: (!) 160/79 Pulse: 93   Resp: 18 SpO2: 95 % O2 Device: None (Room air)    Vitals:    12/17/21 0609   Weight: 105.6 kg (232 lb 14.4 oz)     Vital Signs with Ranges  Temp:  [97.2  F (36.2  C)-97.9  F (36.6  C)] 97.2  F (36.2  C)  Pulse:  [91-93] 93  Resp:  [16-19] 18  BP: (135-160)/(70-79) 160/79  SpO2:  [93 %-95 %] 95 %  I/O last 3 completed shifts:  In: 150 [P.O.:150]  Out: 2150 [Urine:2100; Emesis/NG output:50]    Constitutional: alert, oriented, no acute distress  Eyes: No scleral icterus or injection  ENT: Normocephalic, atraumatic, moist mucus membranes  Respiratory: No secondary muscle use or labored breathing. Lungs clear to auscultation bilaterally without wheezes or rhonchi   Cardiovascular: RRR without murmur  GI: Abdomen soft, appropriately tender to palpation, non-distended.  Bowel sounds active- much more  active than prior exam  MSK: able to move extremities on command without weakness, walks without weakness or ataxia  Skin/Integumen: warm, dry, in tact without rash or hives  Vascular: No calf tenderness, PCDs in place    Medications     lactated ringers 100 mL/hr at 12/18/21 2116       acetaminophen  650 mg Oral Q6H WA     atorvastatin  40 mg Oral Daily     docusate sodium  100 mg Oral BID     insulin aspart  1-7 Units Subcutaneous TID AC     insulin aspart  1-5 Units Subcutaneous At Bedtime     sodium chloride (PF)  3 mL Intracatheter Q8H     sucralfate  1 g Oral 4x Daily     tamsulosin  0.4 mg Oral At Bedtime       Data   Recent Labs   Lab 12/19/21  0610 12/19/21  0219 12/18/21  2108 12/18/21  0626 12/18/21  0620 12/17/21  1636 12/17/21  1224   WBC 11.7*  --   --   --  12.9*  --  16.1*   HGB 13.9  --   --   --  14.4  --  14.8   MCV 87  --   --   --  87  --  89     --   --   --  207  --  221     --   --   --  134  --  134   POTASSIUM 3.6  --   --   --  3.7  --  4.4   CHLORIDE 102  --   --   --  103  --  105   CO2 26  --   --   --  25  --  25   BUN 14  --   --   --  16  --  19   CR 0.88  --   --   --  0.88  --  1.15   ANIONGAP 5  --   --   --  6  --  4   MATTHEW 8.2*  --   --   --  8.6  --  8.4*   * 134* 134*   < > 156*   < > 207*    < > = values in this interval not displayed.       No results found for this or any previous visit (from the past 24 hour(s)).

## 2021-12-19 NOTE — PROGRESS NOTES
Pt is POD 2  From robotic assisted laparoscopic L partial nephrectomy. A & O x4. VSS on RA except Bps hypertensive at times. SBA for mobility. CMS Intact. Bowel sounds audible and normoactive; + flatus this shift. Pt is tolerating clear liquid diet except for one emesis during NOC. PRN ODT Zofran effective in alleviating nausea. Pt voiding spontaneously, no difficulty. Uses urinal. 4 Lap sites to abdomen CDI w/ liquid dressings. C/of mild abdominal discomfort & abdomen appears somewhat distended. Pt declines need for pain medication. IV accidentally removed by patient, resource nurse to attempt replacement IV this shift. Plan to continue current care, encourage IS, diet ADAT and ambulation. Discharge pending.

## 2021-12-19 NOTE — PROGRESS NOTES
Urology Daily Progress Note    24 hour events/Subjective:     - had some Nand V last night but better today   - Pain well controlled on current regimen   - Tolerating full liquid diet ; no nausea or vomiting today   - Passing flatus, no bowel movement   - Ambulated x 2 yesterday   - overall better    O:  Vitals: Afebrile, VSS  General: Alert, interactive, in NAD  Resp: Non-labored breathing on RA  Abdomen: Soft, appropriately-tender, mildly distended better than yesterday. Incision(s) c/d/i w/ dermabond; no erythema or drainage.   Ext: Warm and well perfused, No LE edema     I/O last 3 completed shifts:  In: 150 [P.O.:150]  Out: 2150 [Urine:2100; Emesis/NG output:50] - Last 24 hours    I/O this shift:  In: -   Out: 450 [Urine:450] - Since Midnight      Labs/Imaging  Heme:Recent Labs   Lab 12/19/21  0610 12/18/21  0620 12/17/21  1224   WBC 11.7* 12.9* 16.1*   HGB 13.9 14.4 14.8    207 221     Chem:  Recent Labs   Lab 12/19/21  0610 12/18/21  0620 12/17/21  1224 12/17/21  0611   POTASSIUM 3.6 3.7 4.4 3.7   CR 0.88 0.88 1.15 0.86       Assessment/Plan  71 year old y/o male POD#2 s/p Robot Assisted Partial Nephrectomy. Doing well    NEURO Pain well controlled on current regimen.     CV HDS.    PULM Aggressive pulmonary toilet    FEN/GI Advance diet as tolerated     NAD   HEME Hgb as above.     ID Afebrile, no leukocytosis.    Antibiotics: completed   ENDO No issues   ACTIVITY Up as tolerated  Ambulate at least TID    PPx SCDs, ambulation   DISPO Home, likely today      PLan:    Stop IV fluids  Advance diet   Can go home later in the day   Bowel regimen for discharge discussed with the patient    --    Quan Miller MD,   [facility]

## 2021-12-20 LAB
PATH REPORT.COMMENTS IMP SPEC: ABNORMAL
PATH REPORT.COMMENTS IMP SPEC: ABNORMAL
PATH REPORT.COMMENTS IMP SPEC: YES
PATH REPORT.FINAL DX SPEC: ABNORMAL
PATH REPORT.GROSS SPEC: ABNORMAL
PATH REPORT.INTRAOP OBS SPEC DOC: ABNORMAL
PATH REPORT.MICROSCOPIC SPEC OTHER STN: ABNORMAL
PATHOLOGY SYNOPTIC REPORT: ABNORMAL
PHOTO IMAGE: ABNORMAL

## 2021-12-20 PROCEDURE — 88307 TISSUE EXAM BY PATHOLOGIST: CPT | Mod: 26 | Performed by: PATHOLOGY

## 2021-12-20 PROCEDURE — 88304 TISSUE EXAM BY PATHOLOGIST: CPT | Mod: 26 | Performed by: PATHOLOGY

## 2021-12-20 PROCEDURE — 88305 TISSUE EXAM BY PATHOLOGIST: CPT | Mod: 26 | Performed by: PATHOLOGY

## 2021-12-20 PROCEDURE — 88331 PATH CONSLTJ SURG 1 BLK 1SPC: CPT | Mod: 26 | Performed by: PATHOLOGY

## 2021-12-20 NOTE — DISCHARGE SUMMARY
[facility] Discharge Summary    Patient: Sedrick Clements    MRN: [id number]   : 1950         Date of Admission:  2021  Date of Discharge::  2021  6:30 PM  Admitting Physician:  Renato  Discharge Physician:  Fabricio Gross MD             Admission Diagnoses:     Past Medical History:   Diagnosis Date     Cataract      Chronic gastritis      Diabetes (H)      Diverticulosis      Gallstones      Gastroesophageal reflux disease      Hiatal hernia      Peripheral neuropathy      Renal mass, left            Discharge Diagnosis:   Same  Left renal cell carcinoma          Procedures:   Procedure(s): Left robotic partial nephrectomy              Discharge Medications:     Discharge Medication List as of 2021  6:08 PM      START taking these medications    Details   ondansetron (ZOFRAN) 4 MG tablet Take 1 tablet (4 mg) by mouth every 8 hours as needed for nausea, Disp-20 tablet, R-0, E-Prescribe      ondansetron (ZOFRAN-ODT) 4 MG ODT tab Take 1 tablet (4 mg) by mouth every 8 hours as needed for nausea, Disp-20 tablet, R-0, Local Print      !! oxyCODONE (ROXICODONE) 5 MG tablet Take 2 tablets (10 mg) by mouth every 6 hours as needed for severe pain, Disp-20 tablet, R-0, E-Prescribe      !! oxyCODONE (ROXICODONE) 5 MG tablet Take 2 tablets (10 mg) by mouth every 6 hours as needed for severe pain, Disp-20 tablet, R-0, Local Print      !! SENNA-docusate sodium (SENNA S) 8.6-50 MG tablet Take 1 tablet by mouth At Bedtime for 15 days, Disp-15 tablet, R-0, Local Print      !! SENNA-docusate sodium (SENNA S) 8.6-50 MG tablet Take 1 tablet by mouth At Bedtime, Disp-30 tablet, R-0, E-Prescribe       !! - Potential duplicate medications found. Please discuss with provider.      CONTINUE these medications which have NOT CHANGED    Details   atorvastatin (LIPITOR) 80 MG tablet Take 40 mg by mouth daily (0.5 X 80 mg), Historical      diphenoxylate-atropine (LOMOTIL) 2.5-0.025 MG tablet Take 1 tablet by  mouth 4 times daily as needed for diarrhea, Historical      glipiZIDE (GLUCOTROL XL) 10 MG 24 hr tablet Take 10 mg by mouth 2 times daily, Historical      insulin degludec (TRESIBA) 100 UNIT/ML pen Inject 20 Units Subcutaneous daily, Historical      ketoconazole (NIZORAL) 2 % external shampoo Apply topically daily as needed for itching or irritationHistorical      !! oxyCODONE IR (ROXICODONE) 10 MG tablet Take 5 mg by mouth every 4 hours as needed for severe pain, Historical      semaglutide (OZEMPIC, 1 MG/DOSE,) 2 MG/1.5ML pen Inject 0.5 mg Subcutaneous every 7 days (Mondays), Historical      sucralfate (CARAFATE) 1 GM tablet Take 1 g by mouth 4 times daily, Historical      tamsulosin (FLOMAX) 0.4 MG capsule Take 0.4 mg by mouth At Bedtime, Historical       !! - Potential duplicate medications found. Please discuss with provider.                Consultations:   Hospitalist consult          Brief History of Illness:   71 year old male with left renal mass suspicious for malignancy. Elected for surgical removal.           Hospital Course:   The patient was admitted to the hospital and underwent the above named procedures.  For specific details, please refer to the dictated operative report in the patient's chart.  In summary, the patient tolerated the procedure well and there were no intraoperative complications.  Following the procedure the patient was admitted to the floor for routine post operative care.      The patient's overall hospital course was uneventful, and they remained hemodynamically stable, and afebrile for the hospital stay.  Diet was able to be advanced as tolerated to a regular diet, which was tolerated well before discharge. Patient also had return of antegrade bowel function prior to discharging from the hospital.  Labs also remained stable.  At this time the patient was determined stable for discharge from the hospital.            Discharge Instructions and Follow-Up:   Discharge diet: Regular    Discharge activity: No heavy lifting, pushing, pulling for 6 week(s)   Discharge follow-up: Follow up with Dr. Gross in 2-3 weeks   Wound care: Ice to area for comfort  Keep wound clean and dry  May get incision wet in shower but do not soak or scrub           Discharge Disposition:   Discharged to home      Attestation: I have reviewed today's vital signs, notes, medications, labs and imaging.    Fabricio Gross MD  December 20, 2021

## 2021-12-20 NOTE — PLAN OF CARE
Pt is AOx4, IV removed, discharge instructions reviewed with pt, lap sites wnl, meds given, follow-up appts reviewed. Pt took all belongings home with him.

## 2022-01-06 ENCOUNTER — VIRTUAL VISIT (OUTPATIENT)
Dept: UROLOGY | Facility: CLINIC | Age: 72
End: 2022-01-06
Payer: COMMERCIAL

## 2022-01-06 VITALS — BODY MASS INDEX: 32.88 KG/M2 | HEIGHT: 69 IN | WEIGHT: 222 LBS

## 2022-01-06 DIAGNOSIS — N28.89 LEFT RENAL MASS: ICD-10-CM

## 2022-01-06 DIAGNOSIS — C64.2 RENAL CELL CARCINOMA, LEFT (H): Primary | ICD-10-CM

## 2022-01-06 PROCEDURE — 99024 POSTOP FOLLOW-UP VISIT: CPT | Performed by: UROLOGY

## 2022-01-06 ASSESSMENT — PAIN SCALES - GENERAL: PAINLEVEL: NO PAIN (1)

## 2022-01-06 ASSESSMENT — MIFFLIN-ST. JEOR: SCORE: 1762.37

## 2022-01-06 NOTE — LETTER
1/6/2022      RE: Sedrick Clements  [address]  [facility] [address]       Sedrick Clements is a 71 year old male who is being evaluated via a billable telephone visit.      Patient has given verbal consent for Telephone visit?  Yes    What phone number would you like to be contacted at? [phone]    How would you like to obtain your AVS? MyChart    Chief Complaint   It was my pleasure to speak with Sedrick Clements who is a 71 year old male for follow-up of Renal Cell Carcinoma.      HPI  Sedrick Clements is a very pleasant 71 year old male who presents with a history of renal cell carcinoma. Left robotic partial nephrectomy completed 12/17/2021. Stage pT1a RCC negative margins.    He has recovered without complication, but has been bothered by GI upset. Has had gaseous distension and difficulty with PO intake. Has continued to have BMs, and reports a solid BM today and overall feeling better.    Left Robotic Partial Nephrectomy 12/17/2021  SPECIMEN   Procedure  Partial nephrectomy    Specimen Laterality  Left    TUMOR   Histologic Type  Clear cell renal cell carcinoma    Histologic Grade  G3: Nucleoli conspicuous and eosinophilic at 100x magnification    Tumor Size  Greatest Dimension (Centimeters): 2.2 cm   Tumor Focality  Unifocal    Tumor Extension  Tumor limited to kidney    Sarcomatoid Features  Not identified    Rhabdoid Features  Not identified    Tumor Necrosis  Not identified    MARGINS   Margins  Uninvolved by invasive carcinoma    LYMPH NODES   Regional Lymph Nodes  No lymph nodes submitted or found    PATHOLOGIC STAGE CLASSIFICATION (pTNM, AJCC 8th Edition)      Primary Tumor (pT)  pT1a    Regional Lymph Nodes (pN)  pNX      I have reviewed and updated the patient's Past Medical History, Social History, Family History and Medication List.    Review of Systems   Constitutional, HEENT, cardiovascular, pulmonary, gi and gu systems are negative, except as otherwise  noted.    ALLERGIES  Insulins, Lantus [insulin glargine], and Empagliflozin    Vitals:  No vitals were obtained today due to virtual visit.    Physical Exam   healthy, alert and no distress  PSYCH: Alert and oriented times 3; coherent speech, normal   rate and volume, able to articulate logical thoughts, able   to abstract reason, no tangential thoughts, no hallucinations   or delusions  His affect is normal and pleasant  RESP: No cough, no audible wheezing, able to talk in full sentences  Remainder of exam unable to be completed due to telephone visits    ASSESSMENT and PLAN  71 year old male with stage pT1a RCC s/p left robotic partial nephrectomy 12/17/2021. We discussed his pathology today and role for ongoing surveillance. We reviewed ongoing activity restrictions and expectations for recovery. Regarding his abdominal discomfort, we discussed possible causes, but it is reassuring that he had a BM today and encouraged continued monitoring. If not improved in the next 1-2 weeks, would consider CT or further workup.    - Follow-up 3 months with CT and labs    Orders  Orders Placed This Encounter   Procedures     CT Renal Mass wo & w Contrast     Basic metabolic panel [LAB15]     CBC with platelets [SQG227]     Phone call duration: 12 minutes    15 total minutes spent on the date of the encounter including direct interaction with the patient, performing chart review, documentation and further activities as noted above.    Fabricio Gross MD  Urology  [facility]

## 2022-01-06 NOTE — PROGRESS NOTES
Sedrick Clements is a 71 year old male who is being evaluated via a billable telephone visit.      Patient has given verbal consent for Telephone visit?  Yes    What phone number would you like to be contacted at? [phone]    How would you like to obtain your AVS? Keysha    Chief Complaint   It was my pleasure to speak with Sedrick Clements who is a 71 year old male for follow-up of Renal Cell Carcinoma.      HPI  Sedrick Clements is a very pleasant 71 year old male who presents with a history of renal cell carcinoma. Left robotic partial nephrectomy completed 12/17/2021. Stage pT1a RCC negative margins.    He has recovered without complication, but has been bothered by GI upset. Has had gaseous distension and difficulty with PO intake. Has continued to have BMs, and reports a solid BM today and overall feeling better.    Left Robotic Partial Nephrectomy 12/17/2021  SPECIMEN   Procedure  Partial nephrectomy    Specimen Laterality  Left    TUMOR   Histologic Type  Clear cell renal cell carcinoma    Histologic Grade  G3: Nucleoli conspicuous and eosinophilic at 100x magnification    Tumor Size  Greatest Dimension (Centimeters): 2.2 cm   Tumor Focality  Unifocal    Tumor Extension  Tumor limited to kidney    Sarcomatoid Features  Not identified    Rhabdoid Features  Not identified    Tumor Necrosis  Not identified    MARGINS   Margins  Uninvolved by invasive carcinoma    LYMPH NODES   Regional Lymph Nodes  No lymph nodes submitted or found    PATHOLOGIC STAGE CLASSIFICATION (pTNM, AJCC 8th Edition)      Primary Tumor (pT)  pT1a    Regional Lymph Nodes (pN)  pNX      I have reviewed and updated the patient's Past Medical History, Social History, Family History and Medication List.    Review of Systems   Constitutional, HEENT, cardiovascular, pulmonary, gi and gu systems are negative, except as otherwise noted.    ALLERGIES  Insulins, Lantus [insulin glargine], and Empagliflozin    Vitals:  No vitals were obtained today  due to virtual visit.    Physical Exam   healthy, alert and no distress  PSYCH: Alert and oriented times 3; coherent speech, normal   rate and volume, able to articulate logical thoughts, able   to abstract reason, no tangential thoughts, no hallucinations   or delusions  His affect is normal and pleasant  RESP: No cough, no audible wheezing, able to talk in full sentences  Remainder of exam unable to be completed due to telephone visits    ASSESSMENT and PLAN  71 year old male with stage pT1a RCC s/p left robotic partial nephrectomy 12/17/2021. We discussed his pathology today and role for ongoing surveillance. We reviewed ongoing activity restrictions and expectations for recovery. Regarding his abdominal discomfort, we discussed possible causes, but it is reassuring that he had a BM today and encouraged continued monitoring. If not improved in the next 1-2 weeks, would consider CT or further workup.    - Follow-up 3 months with CT and labs    Orders  Orders Placed This Encounter   Procedures     CT Renal Mass wo & w Contrast     Basic metabolic panel [LAB15]     CBC with platelets [[id number]]     Phone call duration: 12 minutes    15 total minutes spent on the date of the encounter including direct interaction with the patient, performing chart review, documentation and further activities as noted above.    Fabricio Gross MD  Urology  [facility]

## 2022-01-10 ENCOUNTER — TELEPHONE (OUTPATIENT)
Dept: UROLOGY | Facility: CLINIC | Age: 72
End: 2022-01-10
Payer: COMMERCIAL

## 2022-01-10 NOTE — TELEPHONE ENCOUNTER
----- Message from Gisela العلي sent at 1/6/2022  3:22 PM CST -----  Return in about 3 months (around 4/6/2022).   Check out comments: With CT and labs    TK

## 2023-02-13 ENCOUNTER — PATIENT OUTREACH (OUTPATIENT)
Dept: ONCOLOGY | Facility: CLINIC | Age: 73
End: 2023-02-13
Payer: COMMERCIAL

## 2023-02-13 NOTE — PROGRESS NOTES
[facility]: Cancer Care                                                                                          Attempted to call patient today to discuss the cancer treatment summary that was prepared and sent via the mail. Left a generic voice message requesting a call back at my direct phone number: [phone].    Shasha Bella RN BSN  Cancer Survivorship Coordinator

## 2023-05-09 NOTE — DISCHARGE INSTRUCTIONS
POSTOPERATIVE INSTRUCTIONS    Diagnosis-------------------------------   Renal mass    Procedure-------------------------------  Procedure(s) (LRB):  ROBOTIC ASSISTED LAPAROSCOPIC LEFT PARTIAL NEPHRECTOMY (Left)        Home-going instructions-----------------         Activity Limitation:     - No driving or operating heavy machinery while on narcotic pain medication.   - No strenuous exercise for 6 weeks.   - No lifting, pushing, pulling more than 10 pounds for 6 weeks. Take care when pushing with your arms to stand up.   - Do not strain your belly area. When you bend, sit up or twice, you could strain the area around your incision.   - Do not strain with bowel movements.   - Do not drive until you can press the brake pedal quickly and fully without pain.   - Do not operate a motor vehicle while taking narcotic pain medications    FOLLOW THESE INSTRUCTIONS AS INDICATED BELOW:  - Observe operative area for signs of excessive bleeding.  - You may shower.  - Increase fluid intake to promote clear urine.  - Resume usual diet as tolerated      What to expect while recovering----------- WOUND CARE:  - You may shower and get incisions wet starting 48 hrs after surgery.  - Do not scrub incisions or submerge wounds (aka, bath, pool, hot tub, etc.) for 2 weeks or until wounds have healed and catheter is removed.   - Remove wound dressing 48 hours after surgery if already not removed.   - If purple dermabond glue was used, avoid applying any lotions or ointments.   - Leave incision open to air. Cover with gauze only if needed for comfort or to protect clothing from drainage.    Discharge Medications/instructions:   1) PAIN: Oxycodone is a narcotic medication that has been prescribed for pain. Narcotics will cause sleepiness and constipation, therefore it is best to stop or reduce them as soon as you can and switch to using acetaminophen (Tylenol) and/or ibuprofen (Advil/Motrin), taken as directed on the packaging. Keep in mind  "that certain narcotics can contain acetaminophen, also called \"APAP\" on prescription bottles. Do not take more than 4,000mg of Tylenol (acetaminophen/ APAP) from all sources in any 24 hour period since this can cause liver damage. Never drive, operate machinery or drink alcoholic beverages while you are taking narcotic pain medications.     2) CONSTIPATION: Pericolace (senna/docusate sodium) can be taken twice daily for prevention of constipation since surgery, pain medications and bladder spasm medications can all make you constipated. Please reduce or stop pericolace if you develop loose stools. Other over the counter solutions such as prune juice, miralax, fiber products, senna, and dulcolax can also be used. If you are taking the pericolace but still have not had a bowel movement in 3 days, start over-the-counter Milk of Magnesia taken twice daily until you have a nice bowel movement. Call the office with any concerns.         Questions/concerns------------------------  [facility]: [phone]  [facility]: [phone]        Dori Richardson MD      " Cephalexin Counseling: I counseled the patient regarding use of cephalexin as an antibiotic for prophylactic and/or therapeutic purposes. Cephalexin (commonly prescribed under brand name Keflex) is a cephalosporin antibiotic which is active against numerous classes of bacteria, including most skin bacteria. Side effects may include nausea, diarrhea, gastrointestinal upset, rash, hives, yeast infections, and in rare cases, hepatitis, kidney disease, seizures, fever, confusion, neurologic symptoms, and others. Patients with severe allergies to penicillin medications are cautioned that there is about a 10% incidence of cross-reactivity with cephalosporins. When possible, patients with penicillin allergies should use alternatives to cephalosporins for antibiotic therapy.
